# Patient Record
Sex: FEMALE | Race: WHITE
[De-identification: names, ages, dates, MRNs, and addresses within clinical notes are randomized per-mention and may not be internally consistent; named-entity substitution may affect disease eponyms.]

---

## 2018-05-02 ENCOUNTER — HOSPITAL ENCOUNTER (OUTPATIENT)
Dept: HOSPITAL 75 - RAD | Age: 53
End: 2018-05-02
Attending: FAMILY MEDICINE
Payer: COMMERCIAL

## 2018-05-02 DIAGNOSIS — E04.9: ICD-10-CM

## 2018-05-02 DIAGNOSIS — Z12.31: Primary | ICD-10-CM

## 2018-05-02 PROCEDURE — 76536 US EXAM OF HEAD AND NECK: CPT

## 2018-05-02 PROCEDURE — 77067 SCR MAMMO BI INCL CAD: CPT

## 2018-05-02 NOTE — DIAGNOSTIC IMAGING REPORT
INDICATION: Clinical findings of goiter.



TECHNIQUE: Grayscale sonographic images of the thyroid gland.



CORRELATION STUDY: None.



FINDINGS:

RIGHT LOBE: 4.8 x 1.3 x 1.2 cm.  

Within the superior medial aspect a very small slightly

hypoechoic nodule measures 4 x 3 x 3 mm. Remainder of the right

thyroid gland echotexture is unremarkable.



LEFT LOBE: 4.4 x 1.4 x 1.0 cm.  

There is normal echotexture about the left lobe.



Isthmus appears unremarkable.



IMPRESSION:

Slightly elongated thyroid gland. Very small, nonspecific nodule

about the right lobe.



(Normal gland size:  4-5 x 2 x 2 cm)



Dictated by: 



  Dictated on workstation # [id number]

## 2018-05-14 NOTE — DIAGNOSTIC IMAGING REPORT
Indication: Routine screening.



Comparison is made with prior mammogram from 03/11/2015 and

08/17/2010.



2-D and 3-D bilateral mammography was performed with CAD. Implant

protocol was utilized.



Scattered fibroglandular densities are identified bilaterally.

The patient has bilateral breast implants. Implant contours

appear to be smooth. No mass or malignant appearing

microcalcifications are seen.  The axillae are unremarkable.



Impression: BI-RADS category 2



No mammographic features suspicious for malignancy are

identified.



ACR BI-RADS Category 2: Benign findings.

Result letter will be mailed to the patient.

Note: At least 10% of breast cancer is not imaged by mammography.



Dictated by: 



  Dictated on workstation # [id number]

## 2018-09-12 ENCOUNTER — HOSPITAL ENCOUNTER (OUTPATIENT)
Dept: HOSPITAL 75 - CARD | Age: 53
End: 2018-09-12
Attending: INTERNAL MEDICINE
Payer: COMMERCIAL

## 2018-09-12 VITALS — SYSTOLIC BLOOD PRESSURE: 126 MMHG | DIASTOLIC BLOOD PRESSURE: 82 MMHG

## 2018-09-12 VITALS — WEIGHT: 137 LBS | BODY MASS INDEX: 20.76 KG/M2 | HEIGHT: 68 IN

## 2018-09-12 VITALS — SYSTOLIC BLOOD PRESSURE: 151 MMHG | DIASTOLIC BLOOD PRESSURE: 81 MMHG

## 2018-09-12 VITALS — DIASTOLIC BLOOD PRESSURE: 80 MMHG | SYSTOLIC BLOOD PRESSURE: 149 MMHG

## 2018-09-12 DIAGNOSIS — R00.2: Primary | ICD-10-CM

## 2018-09-12 DIAGNOSIS — E07.9: ICD-10-CM

## 2018-09-12 DIAGNOSIS — G43.909: ICD-10-CM

## 2018-09-12 DIAGNOSIS — R06.02: ICD-10-CM

## 2018-09-12 PROCEDURE — 93017 CV STRESS TEST TRACING ONLY: CPT

## 2018-09-12 PROCEDURE — 78452 HT MUSCLE IMAGE SPECT MULT: CPT

## 2018-09-12 NOTE — STRESS TEST
DATE OF SERVICE:  09/12/2018



EXERCISE MYOVIEW STRESS TEST REPORT



Baseline heart rate is 60.  Baseline blood pressure 142/76.  Baseline EKG is

sinus rhythm with no ischemic changes.



In summary, the patient was injected with 10.24 mCi of technetium-99 Myoview and

the resting images were obtained.  Then, the patient started exercising with

baseline heart rate, blood pressure and EKG mentioned above.  She was able to

exercise for a total of 11 minutes on standard Candido protocol.  With peak

exercise level, EKG was showing 1 mm upsloping ST depression in II, III, aVF, V4

and V5.  Blood pressure was 170/80.  She was injected with 31.8 mCi of

technetium-99 Myoview with peak stress level.  During recovery, heart rate and

blood pressure returned to baseline.  EKG returned to baseline.



The resting and stressed images were reviewed and compared in the short axis,

horizontal long axis, and vertical long axis views.  Review of the images showed

breast attenuation affecting the quality of the images.  There is questionable

reversible ischemia involving the mid to apical anterior wall and anterolateral

wall.  SSS is 7, SDS 7, TID value 1.08.  On the gated images, the left ventricle

appeared to be normal size with normal contractility.  Calculated ejection

fraction 60%.



CONCLUSION:

1.  Excellent exercise tolerance, a total of 11 minutes on standard Candido

protocol, total of 12.1 METS achieving 84% of maximum expected heart rate.

2.  Appropriate heart rate and blood pressure response to exercise returned to

baseline during recovery.

3.  Nondiagnostic EKG changes with exercise returned to baseline during

recovery.

4.  Breast attenuation with mild reversible ischemia involving the whole

anterior wall and anterolateral wall.

5.  Normal left ventricular size with normal contractility.  Calculated ejection

fraction 60%.





Job ID: [id number]

DocumentID: [id number]

Dictated Date:  09/12/2018 16:40:20

Transcription Date: 09/12/2018 20:04:44

Dictated By: JOHN JACKSON MD

## 2018-09-20 ENCOUNTER — HOSPITAL ENCOUNTER (OUTPATIENT)
Dept: HOSPITAL 75 - CARD | Age: 53
End: 2018-09-20
Attending: INTERNAL MEDICINE
Payer: COMMERCIAL

## 2018-09-20 DIAGNOSIS — G43.909: ICD-10-CM

## 2018-09-20 DIAGNOSIS — R00.2: Primary | ICD-10-CM

## 2018-09-20 DIAGNOSIS — E07.9: ICD-10-CM

## 2018-09-20 DIAGNOSIS — R06.02: ICD-10-CM

## 2018-09-20 PROCEDURE — 93306 TTE W/DOPPLER COMPLETE: CPT

## 2018-09-26 ENCOUNTER — HOSPITAL ENCOUNTER (OUTPATIENT)
Dept: HOSPITAL 75 - CATH | Age: 53
Discharge: HOME | End: 2018-09-26
Attending: INTERNAL MEDICINE
Payer: COMMERCIAL

## 2018-09-26 VITALS — SYSTOLIC BLOOD PRESSURE: 130 MMHG | DIASTOLIC BLOOD PRESSURE: 81 MMHG

## 2018-09-26 VITALS — HEIGHT: 68 IN | BODY MASS INDEX: 20.61 KG/M2 | WEIGHT: 136 LBS

## 2018-09-26 VITALS — SYSTOLIC BLOOD PRESSURE: 125 MMHG | DIASTOLIC BLOOD PRESSURE: 82 MMHG

## 2018-09-26 VITALS — SYSTOLIC BLOOD PRESSURE: 129 MMHG | DIASTOLIC BLOOD PRESSURE: 85 MMHG

## 2018-09-26 VITALS — DIASTOLIC BLOOD PRESSURE: 79 MMHG | SYSTOLIC BLOOD PRESSURE: 126 MMHG

## 2018-09-26 VITALS — DIASTOLIC BLOOD PRESSURE: 80 MMHG | SYSTOLIC BLOOD PRESSURE: 139 MMHG

## 2018-09-26 VITALS — SYSTOLIC BLOOD PRESSURE: 130 MMHG | DIASTOLIC BLOOD PRESSURE: 84 MMHG

## 2018-09-26 VITALS — SYSTOLIC BLOOD PRESSURE: 138 MMHG | DIASTOLIC BLOOD PRESSURE: 87 MMHG

## 2018-09-26 VITALS — SYSTOLIC BLOOD PRESSURE: 126 MMHG | DIASTOLIC BLOOD PRESSURE: 77 MMHG

## 2018-09-26 VITALS — DIASTOLIC BLOOD PRESSURE: 73 MMHG | SYSTOLIC BLOOD PRESSURE: 135 MMHG

## 2018-09-26 VITALS — SYSTOLIC BLOOD PRESSURE: 130 MMHG | DIASTOLIC BLOOD PRESSURE: 70 MMHG

## 2018-09-26 VITALS — SYSTOLIC BLOOD PRESSURE: 128 MMHG | DIASTOLIC BLOOD PRESSURE: 82 MMHG

## 2018-09-26 VITALS — DIASTOLIC BLOOD PRESSURE: 83 MMHG | SYSTOLIC BLOOD PRESSURE: 142 MMHG

## 2018-09-26 DIAGNOSIS — R07.9: Primary | ICD-10-CM

## 2018-09-26 DIAGNOSIS — I10: ICD-10-CM

## 2018-09-26 DIAGNOSIS — R06.02: ICD-10-CM

## 2018-09-26 DIAGNOSIS — Z79.899: ICD-10-CM

## 2018-09-26 DIAGNOSIS — R53.83: ICD-10-CM

## 2018-09-26 DIAGNOSIS — Z82.49: ICD-10-CM

## 2018-09-26 DIAGNOSIS — E78.5: ICD-10-CM

## 2018-09-26 DIAGNOSIS — I25.10: ICD-10-CM

## 2018-09-26 LAB
ALBUMIN SERPL-MCNC: 4.2 GM/DL (ref 3.2–4.5)
ALP SERPL-CCNC: 57 U/L (ref 40–136)
ALT SERPL-CCNC: 19 U/L (ref 0–55)
APTT BLD: 29 SEC (ref 24–35)
BILIRUB SERPL-MCNC: 0.7 MG/DL (ref 0.1–1)
BUN/CREAT SERPL: 17
CALCIUM SERPL-MCNC: 9.2 MG/DL (ref 8.5–10.1)
CHLORIDE SERPL-SCNC: 106 MMOL/L (ref 98–107)
CHOLEST SERPL-MCNC: 161 MG/DL (ref ?–200)
CO2 SERPL-SCNC: 28 MMOL/L (ref 21–32)
CREAT SERPL-MCNC: 0.78 MG/DL (ref 0.6–1.3)
ERYTHROCYTE [DISTWIDTH] IN BLOOD BY AUTOMATED COUNT: 13.6 % (ref 10–14.5)
GFR SERPLBLD BASED ON 1.73 SQ M-ARVRAT: > 60 ML/MIN
GLUCOSE SERPL-MCNC: 87 MG/DL (ref 70–105)
HCT VFR BLD CALC: 37 % (ref 35–52)
HDLC SERPL-MCNC: 80 MG/DL (ref 40–60)
HGB BLD-MCNC: 12.4 G/DL (ref 11.5–16)
INR PPP: 1 (ref 0.8–1.4)
MCH RBC QN AUTO: 30 PG (ref 25–34)
MCHC RBC AUTO-ENTMCNC: 33 G/DL (ref 32–36)
MCV RBC AUTO: 90 FL (ref 80–99)
PLATELET # BLD: 191 10^3/UL (ref 130–400)
PMV BLD AUTO: 10.3 FL (ref 7.4–10.4)
POTASSIUM SERPL-SCNC: 3.9 MMOL/L (ref 3.6–5)
PROT SERPL-MCNC: 6.5 GM/DL (ref 6.4–8.2)
PROTHROMBIN TIME: 13 SEC (ref 12.2–14.7)
RBC # BLD AUTO: 4.13 10^6/UL (ref 4.35–5.85)
SODIUM SERPL-SCNC: 141 MMOL/L (ref 135–145)
TRIGL SERPL-MCNC: 59 MG/DL (ref ?–150)
VLDLC SERPL CALC-MCNC: 12 MG/DL (ref 5–40)
WBC # BLD AUTO: 4.9 10^3/UL (ref 4.3–11)

## 2018-09-26 PROCEDURE — 80053 COMPREHEN METABOLIC PANEL: CPT

## 2018-09-26 PROCEDURE — 80061 LIPID PANEL: CPT

## 2018-09-26 PROCEDURE — 87081 CULTURE SCREEN ONLY: CPT

## 2018-09-26 PROCEDURE — 85027 COMPLETE CBC AUTOMATED: CPT

## 2018-09-26 PROCEDURE — 85610 PROTHROMBIN TIME: CPT

## 2018-09-26 PROCEDURE — 93458 L HRT ARTERY/VENTRICLE ANGIO: CPT

## 2018-09-26 PROCEDURE — 85730 THROMBOPLASTIN TIME PARTIAL: CPT

## 2018-09-26 PROCEDURE — 71045 X-RAY EXAM CHEST 1 VIEW: CPT

## 2018-09-26 PROCEDURE — 36415 COLL VENOUS BLD VENIPUNCTURE: CPT

## 2018-09-26 NOTE — XMS REPORT
[facility]

 Created on: 2018



Sumi Katina

External Reference #: [id number]

: 1965

Sex: Female



Demographics







 Address  [address] [place]  [address]

 

 Preferred Language  Unknown

 

 Marital Status  Unknown

 

 Cheondoism Affiliation  Unknown

 

 Race  Unknown

 

 Ethnic Group  Unknown





Author







 Author  KALYN  SUKI

 

 Organization  [facility]

 

 Address  [address] [place]  [address]



 

 Phone  [phone]







Care Team Providers







 Care Team Member Name  Role  Phone

 

 SUKI MCCAIN  Unavailable  [phone]







PROBLEMS







 Type  Condition  ICD9-CM Code  KRM06-SS Code  Onset Dates  Condition Status  
SNOMED Code

 

 Problem  Encounter for medical examination to establish care     Z00.00     
Active  739919958

 

 Problem  Acquired hypothyroidism     E03.9     Active  869786781

 

 Problem  Essential hypertension     I10     Active  25618137

 

 Problem  Tachycardia     R00.0     Active  7011544

 

 Problem  Herpes     B00.9     Active  88216035

 

 Problem  Hypercholesterolemia     E78.00     Active  33850257

 

 Problem  Menopausal and female climacteric states     N95.1     Active  
980063834

 

 Problem  Body image disturbance     F45.22     Active  75002939

 

 Problem  Seasonal allergic rhinitis, unspecified allergic rhinitis trigger     
J30.2     Active  257787970







ALLERGIES

No Information



ENCOUNTERS







 Encounter  Location  Date  Diagnosis

 

 [place] [address] [address]
1308     

 

 [place] [address] [address]    Menopausal and female climacteric states N95.1

 

 [facility]  [address] [address]    Essential hypertension I10 ; Tachycardia R00.0 ; 
Postmenopausal Z78.0 ; Menopausal and female climacteric states N95.1 ; 
Acquired hypothyroidism E03.9 ; Body image disturbance F45.22 ; Herpes B00.9 
and Bronchitis J40

 

 [facility]  [address] [address] [id number] [place] [address]  17 Oct, 2017  Acquired hypothyroidism E03.9

 

 [place] [address] [address]
2018  13 Oct, 2017  Essential hypertension I10 ; Acquired hypothyroidism E03.9 
and Hypercholesterolemia E78.00

 

 [facility]  [id number] [place] [address]-
[address]  01 Oct, 2017   

 

 [facility]  [id number] [place] [address]-
1460  06 Sep, 2017  Essential hypertension I10 ; Hypercholesterolemia E78.00 
and Acquired hypothyroidism E03.9

 

 [facility]  [address] [address]-
[address]  26 May, 2017   

 

 [facility]  [id number] [place] [address]-
6578  19 May, 2017   

 

 [facility]  [id number] [place] [address]-
8396    Essential hypertension I10 ; Acquired hypothyroidism E03.9 
; Menopausal and female climacteric states N95.1 ; Hypercholesterolemia E78.00 
and Seasonal allergic rhinitis, unspecified allergic rhinitis trigger J30.2

 

 [facility]  [id number] [place] [address]-
360[address]     

 

 [facility]  [address] [address]-
[phone]  10 Apr, 2017  Bronchitis J40 and Cough R05

 

 [facility]  [id number] [place] [phone]    Seasonal allergic rhinitis, unspecified allergic rhinitis 
trigger J30.2

 

 [facility]  [id number] [place] [address]-
6[address]     

 

 [facility]  [id number] [place] [address]-
5261  31 Oct, 2016   

 

 [facility]  [id number] [place] [address]-
1780  25 Oct, 2016  Essential hypertension I10 ; Acquired hypothyroidism E03.9 
and Encounter for medical examination to establish care Z00.00

 

 [facility]  [address] 
[id number]  24 Oct, 2016  Pharyngitis, unspecified etiology J02.9

 

 [facility]  [id number], [place] [address]-
3780  11 Oct, 2016   

 

 [facility]  [id number] [address]  26 Sep, 2016  Essential hypertension I10 ; Menopausal and female 
climacteric states N95.1 ; Acquired hypothyroidism E03.9 and Encounter for 
medical examination to establish care Z00.00

 

 [facility]  [id number], [place] [address]-
[address]  23 Sep, 2016   

 

 [facility]  [id number] [place] [address]-
8239  21 Sep, 2016   

 

 [facility]  [id number] [place] [phone]-
7445  09 Sep, 2016   

 

 [facility] [place]  [id number] 
[id number]  30 Aug, 2016  Sore throat J02.9

 

 [facility]  [address] [place] [id number] [address]  10 Mar, 2016  Sore throat J02.9 and Acute streptococcal pharyngitis J02.0

 

 [facility]  [id number], [place] [address]-
7[address]  21 Sep, 2015  Folliculitis 704.8







IMMUNIZATIONS

No Known Immunizations



SOCIAL HISTORY

Never Assessed



REASON FOR VISIT

refill



PLAN OF CARE





VITAL SIGNS





MEDICATIONS







 Medication  Instructions  Dosage  Frequency  Start Date  End Date  Duration  
Status

 

 Atorvastatin Calcium 10 mg     TAKE ONE TABLET BY MOUTH ONCE DAILY           
14  Active







RESULTS

No Results



PROCEDURES

No Known procedures



INSTRUCTIONS





MEDICATIONS ADMINISTERED

No Known Medications



MEDICAL (GENERAL) HISTORY







 Type  Description  Date

 

 Medical History  Hypothyroidism   

 

 Medical History  Menopausal   

 

 Medical History  Hypertension   

 

 Surgical History  partial hysterectomy  

 

 Surgical History  tonsillectomy and adenoidectomy

## 2018-09-26 NOTE — CARDIAC CATH REPORT
Cardiac Cath Report


Physician (s)/Assistant (s)


Physician


OJHN JACKSON MD





Pre-Procedure Diagnosis


Pre-Procedure Diagnosis:  Coronary artery disease





Post-Procedure Note


Procedure Start Date:  Sep 26, 2018


Name of Procedure:  


Left heart catheterization


Findings/Procedure Note


PROCEDURE NOTE:


After explaining the procedure to the patient, all pros and cons were explained

, all questions were answered.  The patient signed the consent and then she  

was placed on the cardiac catheterization laboratory. Groin was prepped SL 

fashion local anesthesia was used. Sheath placed in the right radial artery.  

Tiger catheter was used to evaluate the coronaries, left ventricular pressure 

was measured no left ventricular gram was done


At the end of the procedure the sheath was removed. 





FINDINGS:





Hemodynamics 


/25 elevated end-diastolic pressure


Aorta 123/78 mean of 96





ANATOMY:


Left Main is free of obstructive disease


Left Anterior Descending has mild disease nonobstructive disease


Left Circumflex has mild disease nonobstructive disease


Right Coronory Artery is free of obstructive disease





CONCLUSION:


1.  Mild coronary artery disease, no sig obstructive disease


2.  Elevated left ventricular end diastolic pressure





DISCUSSION AND RECOMMENDATION:


Continue with medical therapy, no intervention is needed


Anesthesia Type:  Conscious Sedation


Estimated blood loss (mL):  10 ml


Contrast Amount:  21 ml


Total Radiation Dose:  48 mGy





Post-Procedure Diagnosis


Post-operative diagnosis:  


Chest pain nonspecific etiology


Coronary artery disease


Hypertension


Hyperlipidemia











JOHN JACKSON MD Sep 26, 2018 09:17

## 2018-09-26 NOTE — XMS REPORT
[facility]

 Created on: 2017



Katina Jonas

External Reference #: [id number]

: 1965

Sex: Female



Demographics







 Address  [address] [place]  [address]

 

 Preferred Language  Unknown

 

 Marital Status  Unknown

 

 Episcopal Affiliation  Unknown

 

 Race  Unknown

 

 Ethnic Group  Unknown





Author







 Author  TERESSA CASPER

 

 [facility]

 

 Address  [address] [place]  [address]



 

 Phone  [phone]







Care Team Providers







 Care Team Member Name  Role  Phone

 

 TRANGDEETERESSA  Unavailable  [phone]







PROBLEMS







 Type  Condition  ICD9-CM Code  MFM56-IH Code  Onset Dates  Condition Status  
SNOMED Code

 

 Assessment  Sore throat     J02.9  30 Aug, 2016  Active  [id number]







ALLERGIES







 Substance  Reaction  Event Type  Date  Status

 

 Sulfamethoxazole-Trimethoprim  Unknown  Drug Allergy  30 Aug, 2016  Active







SOCIAL HISTORY

No smoking Hx information available



PLAN OF CARE





VITAL SIGNS







 Height  68 in  2016

 

 Weight  150 lbs  2016

 

 Heart Rate  76 bpm  2016

 

 Respiratory Rate  18   2016

 

 BMI  22.80 kg/m2  2016

 

 Blood pressure systolic  120 mmHg  2016

 

 Blood pressure diastolic  68 mmHg  2016







MEDICATIONS







 Medication  Instructions  Dosage  Frequency  Start Date  End Date  Duration  
Status

 

 Estrace 0.1 MG/GM                    Active

 

 Toprol XL 25 MG  Orally Once a day  1 tablet  24h           Active

 

 Levothyroxine Sodium 25 MCG  Orally Once a day  1 tablet  24h           Active

 

 Valacyclovir HCl 500 MG  Orally Once a day  1 tablet  24h           Active

 

 Divigel 1 MG/GM  Transdermal Once a day  1 application to affected area  24h  
         Active







RESULTS







 Name  Result  Date  Reference Range

 

 STREP A (IN HOUSE)         

 

 STREP A  negative      

 

 Control  +      

 

 Lot #  [id number]      

 

 Exp date  2016      







PROCEDURES







 Procedure  Date Ordered  Related Diagnosis  Body Site

 

 STREP A ASSAY W/OPTIC  Aug 30, 2016      

 

 Office Visit, Est Pt., Level 3  Aug 30, 2016      







IMMUNIZATIONS

No Known Immunizations

## 2018-09-26 NOTE — XMS REPORT
[facility]

 Created on: 2017



Katina Jonas

External Reference #: [id number]

: 1965

Sex: Female



Demographics







 Address  [address] [place]  [address]

 

 Preferred Language  Unknown

 

 Marital Status  Unknown

 

 Moravian Affiliation  Unknown

 

 Race  Unknown

 

 Ethnic Group  Unknown





Author







 Author  SALLY BERG

 

 [facility]

 

 Address  [address]  [place]  [address]



 

 Phone  [phone]







Care Team Providers







 Care Team Member Name  Role  Phone

 

 SALLY BERG  Unavailable  [phone]







PROBLEMS







 Type  Condition  ICD9-CM Code  WGR99-MJ Code  Onset Dates  Condition Status  
SNOMED Code

 

 Problem  Seasonal allergic rhinitis, unspecified allergic rhinitis trigger     
J30.2     Active  857623547

 

 Problem  Hypercholesterolemia     E78.00     Active  93212961

 

 Problem  Acquired hypothyroidism     E03.9     Active  512800623

 

 Problem  Essential hypertension     I10     Active  73821981

 

 Problem  Encounter for medical examination to establish care     Z00.00     
Active  073298981

 

 Problem  Menopausal and female climacteric states     N95.1     Active  
979586128







ALLERGIES

Unknown Allergies



SOCIAL HISTORY

No smoking Hx information available



PLAN OF CARE





VITAL SIGNS





MEDICATIONS

Unknown Medications



RESULTS

No Results



PROCEDURES

No Known procedures



IMMUNIZATIONS

No Known Immunizations

## 2018-09-26 NOTE — XMS REPORT
[facility]

 Created on: 2017



John Jonasa

External Reference #: [id number]

: 1965

Sex: Female



Demographics







 Address  [address] [place]  [address]

 

 Preferred Language  Unknown

 

 Marital Status  Unknown

 

 Religion Affiliation  Unknown

 

 Race  Unknown

 

 Ethnic Group  Unknown





Author







 Author  SALLY BERG

 

 [facility]

 

 Address  [address]  [place]  [address]



 

 Phone  [phone]







Care Team Providers







 Care Team Member Name  Role  Phone

 

 SALLY BERG  Unavailable  [phone]







PROBLEMS







 Type  Condition  ICD9-CM Code  BLG35-CS Code  Onset Dates  Condition Status  
SNOMED Code

 

 Problem  Seasonal allergic rhinitis, unspecified allergic rhinitis trigger     
J30.2     Active  277299734

 

 Problem  Hypercholesterolemia     E78.00     Active  97577805

 

 Problem  Acquired hypothyroidism     E03.9     Active  558800367

 

 Problem  Essential hypertension     I10     Active  83598570

 

 Problem  Encounter for medical examination to establish care     Z00.00     
Active  279368541

 

 Problem  Menopausal and female climacteric states     N95.1     Active  
179038512







ALLERGIES

No Information



SOCIAL HISTORY

Never Assessed



PLAN OF CARE





VITAL SIGNS





MEDICATIONS







 Medication  Instructions  Dosage  Frequency  Start Date  End Date  Duration  
Status

 

 Valacyclovir HCl 500 MG  Orally Once a day  1 tablet  24h           Active







RESULTS

No Results



PROCEDURES

No Known procedures



IMMUNIZATIONS

No Known Immunizations



MEDICAL (GENERAL) HISTORY







 Type  Description  Date

 

 Medical History  Hypothyroidism   

 

 Medical History  Menopausal   

 

 Medical History  Hypertension   

 

 Surgical History  partial hysterectomy  

 

 Surgical History  tonsillectomy and adenoidectomy

## 2018-09-26 NOTE — XMS REPORT
[facility]

 Created on: 2017



Sumi Katina

External Reference #: [id number]

: 1965

Sex: Female



Demographics







 Address  [address]  [address]

 

 Preferred Language  Unknown

 

 Marital Status  Unknown

 

 Yazidism Affiliation  Unknown

 

 Race  Unknown

 

 Ethnic Group  Unknown





Author







 Author  SALLY BERG

 

 Organization  [facility]

 

 Address  [address]  [place]  [address]



 

 Phone  [phone]







Care Team Providers







 Care Team Member Name  Role  Phone

 

 SALLY BERG  Unavailable  [phone]







PROBLEMS







 Type  Condition  ICD9-CM Code  HXL27-AL Code  Onset Dates  Condition Status  
SNOMED Code

 

 Problem  Encounter for medical examination to establish care     Z00.00     
Active  345529089

 

 Problem  Acquired hypothyroidism     E03.9     Active  763024472

 

 Problem  Essential hypertension     I10     Active  52102862

 

 Problem  Tachycardia     R00.0     Active  7970646

 

 Problem  Herpes     B00.9     Active  97616273

 

 Problem  Hypercholesterolemia     E78.00     Active  76346680

 

 Problem  Menopausal and female climacteric states     N95.1     Active  
126490404

 

 Problem  Body image disturbance     F45.22     Active  06635207

 

 Problem  Seasonal allergic rhinitis, unspecified allergic rhinitis trigger     
J30.2     Active  098157418







ALLERGIES

No Information



SOCIAL HISTORY

Never Assessed



PLAN OF CARE





VITAL SIGNS





MEDICATIONS







 Medication  Instructions  Dosage  Frequency  Start Date  End Date  Duration  
Status

 

 Valacyclovir HCl 500 MG  Orally Once a day  1 tablet  24h        30 days  
Active







RESULTS

No Results



PROCEDURES

No Known procedures



IMMUNIZATIONS

No Known Immunizations



MEDICAL (GENERAL) HISTORY







 Type  Description  Date

 

 Medical History  Hypothyroidism   

 

 Medical History  Menopausal   

 

 Medical History  Hypertension   

 

 Surgical History  partial hysterectomy  

 

 Surgical History  tonsillectomy and adenoidectomy

## 2018-09-26 NOTE — XMS REPORT
[facility]

 Created on: 10/25/2016



ElizabetKatina

External Reference #: [id number]

: 1965

Sex: Female



Demographics







 Address  [address] [place]  [address]

 

 Home Phone  [phone]

 

 Preferred Language  Unknown

 

 Marital Status  Unknown

 

 Taoist Affiliation  Unknown

 

 Race  White

 

 Ethnic Group  Not  or 





Author







 Author  SALLY BERG

 

 Organization  [employer]

 

 Address  Unknown

 

 Phone  Unavailable







Care Team Providers







 Care Team Member Name  Role  Phone

 

 SALLY BERG    Unavailable



                                                                



Allergies

          No Known Allergies                                                   
                                     



Problems

          





 Problem Type  Condition  Code  Onset Dates  Condition Status

 

 Problem  Menopausal and female climacteric states  N95.1     Active

 

 Problem  Acquired hypothyroidism  E03.9     Active

 

 Problem  Essential hypertension  I10     Active

 

 Assessment  Acquired hypothyroidism  E03.9     Active

 

 Assessment  Encounter for medical examination to establish care  Z00.00     
Active

 

 Problem  Encounter for medical examination to establish care  Z00.00     Active

 

 Assessment  Essential hypertension  I10     Active



                                                                               
                                                                     



Medications

          No Known Medications                                                 
                                       



Procedures

          





 Procedure  Coding System  Code  Date

 

 ASSAY OF FREE THYROXINE  CPT-4  10845  Oct 25, 2016

 

 COMPLETE CBC W/AUTO DIFF WBC  CPT-4  97154  Oct 25, 2016

 

 ASSAY THYROID STIM HORMONE  CPT-4  07042  Oct 25, 2016

 

 COMPREHEN METABOLIC PANEL  CPT-4  98265  Oct 25, 2016

 

 LIPID PANEL  CPT-4  79912  Oct 25, 2016

 

 VENIPUNCT, ROUTINE*  CPT-4  76299  Oct 25, 2016



                                                                               
                                       



Results

          





 Name  Result  Date  Reference Range  Unit  Abnormality Flag

 

 ROUTINE VENIPUNCTURE               



                                                                    



Summary Purpose

          [employer] Submission

## 2018-09-26 NOTE — XMS REPORT
[facility]

 Created on: 2018



SumiKatina small

External Reference #: [id number]

: 1965

Sex: Female



Demographics







 Address  [address] [place]  [address]

 

 Preferred Language  Unknown

 

 Marital Status  Unknown

 

 Christianity Affiliation  Unknown

 

 Race  Unknown

 

 Ethnic Group  Unknown





Author







 Author  Carley  SALLY

 

 Organization  [facility]

 

 Address  [address]  [place]  [address]



 

 Phone  [phone]







Care Team Providers







 Care Team Member Name  Role  Phone

 

 SALLY Howe  Unavailable  [phone]







PROBLEMS







 Type  Condition  ICD9-CM Code  ENA65-FC Code  Onset Dates  Condition Status  
SNOMED Code

 

 Problem  Encounter for medical examination to establish care     Z00.00     
Active  996537126

 

 Problem  Acquired hypothyroidism     E03.9     Active  833144738

 

 Problem  Essential hypertension     I10     Active  40858083

 

 Problem  Tachycardia     R00.0     Active  3082806

 

 Problem  Herpes     B00.9     Active  61702398

 

 Problem  Hypercholesterolemia     E78.00     Active  50261692

 

 Problem  Menopausal and female climacteric states     N95.1     Active  
721952937

 

 Problem  Body image disturbance     F45.22     Active  56030418

 

 Problem  Seasonal allergic rhinitis, unspecified allergic rhinitis trigger     
J30.2     Active  778400672







ALLERGIES

No Information



ENCOUNTERS







 Encounter  Location  Date  Diagnosis

 

 [place] [address] [address]
20[address]    Menopausal and female climacteric states N95.1

 

 [place] [address] [address]
9226    Essential hypertension I10 ; Tachycardia R00.0 ; 
Postmenopausal Z78.0 ; Menopausal and female climacteric states N95.1 ; 
Acquired hypothyroidism E03.9 ; Body image disturbance F45.22 ; Herpes B00.9 
and Bronchitis J40

 

 [facility]  [address] [address]-
[address]  17 Oct, 2017  Acquired hypothyroidism E03.9

 

 [facility]  [address] [address]-
2081  13 Oct, 2017  Essential hypertension I10 ; Acquired hypothyroidism E03.9 
and Hypercholesterolemia E78.00

 

 [place] [address] [place] [id number] [place] [address]-
9929  01 Oct, 2017   

 

 [facility]  [address] [place] [id number] [place] [address]-
2375  06 Sep, 2017  Essential hypertension I10 ; Hypercholesterolemia E78.00 
and Acquired hypothyroidism E03.9

 

 [facility]  [address] [place] [id number] [place] [address]-
1272  26 May, 2017   

 

 [facility]  [address] [address]-
4740  19 May, 2017   

 

 [facility]  [address] [place] [id number] [place] [address]-
7994    Essential hypertension I10 ; Acquired hypothyroidism E03.9 
; Menopausal and female climacteric states N95.1 ; Hypercholesterolemia E78.00 
and Seasonal allergic rhinitis, unspecified allergic rhinitis trigger J30.2

 

 [facility]  [address] [address]-
[address]     

 

 [facility]  [address] [place] [id number] [place] [address]-
1724  10 Apr, 2017  Bronchitis J40 and Cough R05

 

 [facility]  [address] [place] [id number] [place] [address]
[phone]    Seasonal allergic rhinitis, unspecified allergic rhinitis 
trigger J30.2

 

 [facility]  [address] [place] [id number] [place] [address]-
8340     

 

 [facility]  [address] [place] [id number] [place] [address]-
9801  31 Oct, 2016   

 

 [facility]  [address] [place] [id number] [place] [address]-
[phone]  25 Oct, 2016  Essential hypertension I10 ; Acquired hypothyroidism E03.9 
and Encounter for medical examination to establish care Z00.00

 

 [facility]  [address] [place] [id number] [place] [address] 
[phone]  24 Oct, 2016  Pharyngitis, unspecified etiology J02.9

 

 [facility]  [address] [address]-
3039  11 Oct, 2016   

 

 [facility]  [address] [place] [address]-
3908  26 Sep, 2016  Essential hypertension I10 ; Menopausal and female 
climacteric states N95.1 ; Acquired hypothyroidism E03.9 and Encounter for 
medical examination to establish care Z00.00

 

 [facility]  [address] [place] [id number] [place] [address]-
5674  23 Sep, 2016   

 

 [facility]  [address] [place] [id number] [place] [address]-
[phone]  21 Sep, 2016   

 

 [facility]  [address] [place] [id number] [place] [address]-
[phone]  09 Sep, 2016   

 

 [facility] [place]  [address] [place] [id number] [place] [address] 
[phone]  30 Aug, 2016  Sore throat J02.9

 

 [facility] WALK IN [facility]  [address], [place] [address]
-[phone]  10 Mar, 2016  Sore throat J02.9 and Acute streptococcal pharyngitis J02.0

 

 [facility]  [address] [place] [id number] [place] [address]-
2015  21 Sep, 2015  Folliculitis 704.8







IMMUNIZATIONS

No Known Immunizations



SOCIAL HISTORY

Never Assessed



REASON FOR VISIT





PLAN OF CARE





VITAL SIGNS





MEDICATIONS







 Medication  Instructions  Dosage  Frequency  Start Date  End Date  Duration  
Status

 

 Valacyclovir HCl 500 mg  Orally Once a day  1 tablet  24h           Active

 

 Toprol XL 25 MG  Orally Once a day  1 tablet  24h           Active

 

 Levothyroxine Sodium 25 MCG  Orally Once a day  1 tablet  24h           Active

 

 Atorvastatin Calcium 10 mg  Orally Once a day  1 tablet  24h      
    Active







RESULTS

No Results



PROCEDURES

No Known procedures



INSTRUCTIONS





MEDICATIONS ADMINISTERED

No Known Medications



MEDICAL (GENERAL) HISTORY







 Type  Description  Date

 

 Medical History  Hypothyroidism   

 

 Medical History  Menopausal   

 

 Medical History  Hypertension   

 

 Surgical History  partial hysterectomy  2005

 

 Surgical History  tonsillectomy and adenoidectomy

## 2018-09-26 NOTE — XMS REPORT
[facility]

 Created on: 10/13/2016



ElizabetKatina

External Reference #: [id number]

: 1965

Sex: Female



Demographics







 Address  [address] [place]  [address]

 

 Home Phone  [phone]

 

 Preferred Language  Unknown

 

 Marital Status  Unknown

 

 Moravian Affiliation  Unknown

 

 Race  White

 

 Ethnic Group  Not  or 





Author







 Author  SALLY BERG

 

 Organization  [employer]

 

 Address  Unknown

 

 Phone  Unavailable







Care Team Providers







 Care Team Member Name  Role  Phone

 

 SALLY BERG    Unavailable



                                                                



Allergies

          No Known Allergies                                                   
                                     



Problems

          





 Problem Type  Condition  Code  Onset Dates  Condition Status

 

 Problem  Menopausal and female climacteric states  N95.1     Active

 

 Problem  Acquired hypothyroidism  E03.9     Active

 

 Problem  Essential hypertension  I10     Active

 

 Problem  Encounter for medical examination to establish care  Z00.00     Active



                                                                               
                                       



Medications

          





 Medication  Code System  Code  Instructions  Start Date  End Date  Status  
Dosage

 

 Estrace  NDC  [address]-59  0.1 MG/GM Vaginal Once a day           1 gram

 

 Premarin  NDC  [address]-50  0.625 MG/GM Vaginal Once a day  Oct 13, 2016    
    as directed

 

 Divigel  NDC  [address]-50  1 MG/GM Transdermal Once a day           1 
application to affected area



                                                                               
                   



Results

          No Known Results                                                     
               



Summary Purpose

          [employer] Submission

## 2018-09-26 NOTE — XMS REPORT
[facility]

 Created on: 2017



Katina Jonas

External Reference #: [id number]

: 1965

Sex: Female



Demographics







 Address  [address] [place] [address]  [address]

 

 Preferred Language  Unknown

 

 Marital Status  Unknown

 

 Restoration Affiliation  Unknown

 

 Race  Unknown

 

 Ethnic Group  Unknown





Author







 Author  SALLY BERG

 

 [facility]

 

 Address  [address]  [place]  [address]



 

 Phone  [phone]







Care Team Providers







 Care Team Member Name  Role  Phone

 

 SALLY BERG  Unavailable  [phone]







PROBLEMS

Unknown Problems



ALLERGIES

Unknown Allergies



SOCIAL HISTORY

No smoking Hx information available



PLAN OF CARE





VITAL SIGNS





MEDICATIONS







 Medication  Instructions  Dosage  Frequency  Start Date  End Date  Duration  
Status

 

 Toprol XL 25 MG  Orally Once a day  1 tablet  24h           Active

 

 Valacyclovir HCl 500 MG  Orally Once a day  1 tablet  24h           Active







RESULTS

No Results



PROCEDURES

No Known procedures



IMMUNIZATIONS

No Known Immunizations

## 2018-09-26 NOTE — XMS REPORT
[facility]

 Created on: 2018



Sumi Katina

External Reference #: [id number]

: 1965

Sex: Female



Demographics







 Address  [address] [place]  [address]

 

 Preferred Language  Unknown

 

 Marital Status  Unknown

 

 Judaism Affiliation  Unknown

 

 Race  Unknown

 

 Ethnic Group  Unknown





Author







 Author  Carley  SALLY

 

 Organization  [facility]

 

 Address  [address]  [place]  [address]



 

 Phone  [phone]







Care Team Providers







 Care Team Member Name  Role  Phone

 

 SALLY Howe  Unavailable  [phone]







PROBLEMS







 Type  Condition  ICD9-CM Code  QWC00-BO Code  Onset Dates  Condition Status  
SNOMED Code

 

 Problem  Encounter for medical examination to establish care     Z00.00     
Active  199273018

 

 Problem  Acquired hypothyroidism     E03.9     Active  246606141

 

 Problem  Essential hypertension     I10     Active  86544284

 

 Problem  Tachycardia     R00.0     Active  2378498

 

 Problem  Herpes     B00.9     Active  41065196

 

 Problem  Hypercholesterolemia     E78.00     Active  98016426

 

 Problem  Menopausal and female climacteric states     N95.1     Active  
704124760

 

 Problem  Body image disturbance     F45.22     Active  10277699

 

 Problem  Seasonal allergic rhinitis, unspecified allergic rhinitis trigger     
J30.2     Active  626699632







ALLERGIES

No Information



ENCOUNTERS







 Encounter  Location  Date  Diagnosis

 

 [place] [address] [address]  20 2018  Menopausal and female climacteric states N95.1

 

 [place] [address] [address]-
4819    Essential hypertension I10 ; Tachycardia R00.0 ; 
Postmenopausal Z78.0 ; Menopausal and female climacteric states N95.1 ; 
Acquired hypothyroidism E03.9 ; Body image disturbance F45.22 ; Herpes B00.9 
and Bronchitis J40

 

 [facility]  [address] [address]-
[address]  17 Oct, 2017  Acquired hypothyroidism E03.9

 

 [facility]  [address] [address]-
1433  13 Oct, 2017  Essential hypertension I10 ; Acquired hypothyroidism E03.9 
and Hypercholesterolemia E78.00

 

 [place] [address] [place] [id number] [place] [address]-
6309  01 Oct, 2017   

 

 [facility]  [address] [place] [id number] [place] [address]-
9539  06 Sep, 2017  Essential hypertension I10 ; Hypercholesterolemia E78.00 
and Acquired hypothyroidism E03.9

 

 [facility]  [address] [place] [id number] [place] [address]-
1587  26 May, 2017   

 

 [facility]  [address] [address]-
0156  19 May, 2017   

 

 [facility]  [address] [place] [id number] [place] [address]-
9121    Essential hypertension I10 ; Acquired hypothyroidism E03.9 
; Menopausal and female climacteric states N95.1 ; Hypercholesterolemia E78.00 
and Seasonal allergic rhinitis, unspecified allergic rhinitis trigger J30.2

 

 [facility]  [address] [address]-
[address]     

 

 [facility]  [address] [place] [id number] [place] [address]-
0006  10 Apr, 2017  Bronchitis J40 and Cough R05

 

 [facility]  [address] [place] [id number] [place] [address]
[phone]    Seasonal allergic rhinitis, unspecified allergic rhinitis 
trigger J30.2

 

 [facility]  [address] [place] [id number] [place] [address]-
7987     

 

 [facility]  [address] [place] [id number] [place] [address]-
6165  31 Oct, 2016   

 

 [facility]  [address] [place] [id number] [place] [address]-
0182  25 Oct, 2016  Essential hypertension I10 ; Acquired hypothyroidism E03.9 
and Encounter for medical examination to establish care Z00.00

 

 [facility]  [address] [place] [id number] [place] [address] 
[phone]  24 Oct, 2016  Pharyngitis, unspecified etiology J02.9

 

 [facility]  [address], [place] [address]-
5779  11 Oct, 2016   

 

 [facility]  [address] [place] [address]-
0482  26 Sep, 2016  Essential hypertension I10 ; Menopausal and female 
climacteric states N95.1 ; Acquired hypothyroidism E03.9 and Encounter for 
medical examination to establish care Z00.00

 

 [facility]  [address] [place] [id number] [place] [address]-
1481  23 Sep, 2016   

 

 [facility]  [address] [place] [id number] [place] [address]-
2362  21 Sep, 2016   

 

 [facility]  [address] [place] [id number] [place] [address]-
5982  09 Sep, 2016   

 

 [facility] [place]  [address] [place] [id number] [place] [address] 
[id number]  30 Aug, 2016  Sore throat J02.9

 

 [facility] WALK IN [facility]  [address] [place] [address]
[phone]  10 Mar, 2016  Sore throat J02.9 and Acute streptococcal pharyngitis J02.0

 

 [facility]  [address] [place] [id number] [place] [address]-
2704  21 Sep, 2015  Folliculitis 704.8







IMMUNIZATIONS

No Known Immunizations



SOCIAL HISTORY

Never Assessed



REASON FOR VISIT

Requests return call



PLAN OF CARE





VITAL SIGNS





MEDICATIONS

Unknown Medications



RESULTS

No Results



PROCEDURES

No Known procedures



INSTRUCTIONS





MEDICATIONS ADMINISTERED

No Known Medications



MEDICAL (GENERAL) HISTORY







 Type  Description  Date

 

 Medical History  Hypothyroidism   

 

 Medical History  Menopausal   

 

 Medical History  Hypertension   

 

 Surgical History  partial hysterectomy  2005

 

 Surgical History  tonsillectomy and adenoidectomy

## 2018-09-26 NOTE — XMS REPORT
[facility]

 Created on: 2017



Katina Jonas

External Reference #: [id number]

: 1965

Sex: Female



Demographics







 Address  [address]  [address]

 

 Preferred Language  Unknown

 

 Marital Status  Unknown

 

 Hindu Affiliation  Unknown

 

 Race  Unknown

 

 Ethnic Group  Unknown





Author







 Author  SALLY BERG

 

 [facility]

 

 Address  [address]  [place]  [address]



 

 Phone  [phone]







Care Team Providers







 Care Team Member Name  Role  Phone

 

 SALLY BERG  Unavailable  [phone]







PROBLEMS







 Type  Condition  ICD9-CM Code  KXX44-LA Code  Onset Dates  Condition Status  
SNOMED Code

 

 Problem  Essential hypertension     I10     Active  18620850

 

 Problem  Menopausal and female climacteric states     N95.1     Active  
709612950

 

 Problem  Acquired hypothyroidism     E03.9     Active  363857435

 

 Problem  Encounter for medical examination to establish care     Z00.00     
Active  802202315







ALLERGIES

Unknown Allergies



SOCIAL HISTORY

No smoking Hx information available



PLAN OF CARE





VITAL SIGNS





MEDICATIONS







 Medication  Instructions  Dosage  Frequency  Start Date  End Date  Duration  
Status

 

 Levothyroxine Sodium 25 MCG  Orally Once a day  1 tablet  24h           Active

 

 Toprol XL 25 MG  Orally Once a day  1 tablet  24h           Active

 

 Valacyclovir HCl 500 MG  Orally Once a day  1 tablet  24h           Active







RESULTS

No Results



PROCEDURES

No Known procedures



IMMUNIZATIONS

No Known Immunizations

## 2018-09-26 NOTE — DIAGNOSTIC IMAGING REPORT
INDICATION: Coronary artery disease and dyspnea.



Portable upright AP view of the chest is obtained.



COMPARISON: No previous study is available for comparison at this

time.



FINDINGS: Heart size and pulmonary vasculature are within normal

limits, and the lungs are clear, bilaterally.  



IMPRESSION: Unremarkable chest.   



Dictated by: 



  Dictated on workstation # [id number]

## 2018-09-26 NOTE — CARDIAC PROCEDURE NOTE-CS/ASA
Pre-Procedure Note


Pre-Op Procedure Note


H&P Reviewed


The H&P was reviewed, patient examined and no changes noted.


Date H&P Reviewed:  Sep 26, 2018


Time H&P Reviewed:  08:38





Conscious Sedation Pre-Proced


Time


08:38





ASA Score


3


For ASA 3 and 4: Consider anesthesia and medical clearance. Also, for 

patients with a history of failed moderate sedation consider anesthesia.

















Airway 


 


Lungs 


 


Heart 


 


 ASA score


 


 ASA 1: a normal healthy patient


 


 ASA 2:  a patient with a mild systemic disease (mid diabetes, controlled 

hypertension, obesity 


 


x ASA 3:  a patient with a severe systemic disease that limits activity  (angina

, COPD, prior Myocardial infarction)


 


 ASA 4:  a patient with an incapacitating disease that is a constant threat to 

life (CHF, renal failure)


 


 ASA 5:  a moribund patient not expected to survive 24 hrs.  (ruptured aneurysm)


 


 ASA 6:  a declared brain dead patient whose organs are being harvested.


 


 For emergent operations, add the letter E after the classification











Mallampati Classification


Grade 3





Sedation Plan


Analgesia, Amnesia, Plan communicated to team members, Discussed options with 

patient/fam, Discussed risks with patient/fam


The patient is an appropriate candidate to undergo the planned procedure, 

sedation, and anesthesia.





The patient immediately re-assessed prior to indication.











JOHN JACKSON MD Sep 26, 2018 08:38

## 2018-09-26 NOTE — XMS REPORT
[facility]

 Created on: 2016



Katina Mcneal

External Reference #: [id number]

: 1965

Sex: Female



Demographics







 Address  [address] [place]  [address]

 

 Home Phone  [phone]

 

 Preferred Language  Unknown

 

 Marital Status  Unknown

 

 Alevism Affiliation  Unknown

 

 Race  White

 

 Ethnic Group  Not  or 





Author







 Author  SALLY BERG

 

 [facility]  [employer]

 

 Address  Unknown

 

 Phone  Unavailable







Care Team Providers







 Care Team Member Name  Role  Phone

 

 SALLY BERG    Unavailable



                                                                



Allergies

          No Known Allergies                                                   
                                     



Problems

          





 Problem Type  Condition  Code  Onset Dates  Condition Status

 

 Problem  Menopausal and female climacteric states  N95.1     Active

 

 Problem  Acquired hypothyroidism  E03.9     Active

 

 Problem  Essential hypertension  I10     Active

 

 Problem  Encounter for medical examination to establish care  Z00.00     Active



                                                                               
                                       



Medications

          





 Medication  Code System  Code  Instructions  Start Date  End Date  Status  
Dosage

 

 Atorvastatin Calcium  NDC  [address]-65  10 mg Orally Once a day  2016        1 tablet



                                                                              



Results

          No Known Results                                                     
               



Summary Purpose

          [employer] Submission

## 2018-09-26 NOTE — DISCHARGE INST-POST CATH
Discharge Inst-CATH


Post Cardiac Cath D/C Inst


Follow Up/Plan


Appointment with Dr. Sy's office in 2-4 weeks


CARDIAC CATH DISCHARGE INSTRUCTIONS





*Hold Metformin for 48 hours post heart cath.





ACTIVITY





* Go Home directly and rest.


* Limit activity of the leg (or wrist if it was used) for 7 days including 

aerobics, swimming,


   jogging, bicycling, etc.


* Restrict stair-climbing for 7 days if possible, if not, climb up with your non

-cath leg, then


   bring together on the same step.


* Avoid lifting, pushing, pulling or excessive movement of the affected 

extremity for 7 days.


* Customary sexual activity may be resumed after 2 days-use caution not to use 

a position  


   that strains or causes pain to the affected extremity.


* No driving for 24 hours.


* NO SMOKING. 


* Avoid straining for bowel movements for 7 days.


* Gentle walking on level ground is allowed.


* Returning to work will depend on the type of procedure and the results. Your 

doctor will discuss


   this with you.





CALL YOUR DOCTOR FOR ANY OF THE FOLLOWING:





*If bleeding from the puncture site occurs- Apply gentle pressure to site with 

clean cloth and call


   your doctor or EMS.


* If a knot or lump forms under the skin, increases in size, or causes pain.


* If bruising appears to be worsening or moving further down your leg instead 

of disappearing.


* Temperature above 101 F.





CARE OF YOUR GROIN INCISION;





* Bruising or purple discoloration of the skin near the puncture site is common.


* You may shower only, no bathtub bathing for 5 days.  Be careful to avoid 

slipping as your


   leg may feel stiff.


* If a closure device was used on your femoral artery, please see the attached 

guide regarding


   care of the device and your leg.


* Leave the dressing on, until removed by office staff.





CARE OF YOUR WRIST INCISION;





* Bruising or purple discoloration of the skin near the puncture site is common.


* You may shower.


* DO NOT submerge wrist.


* Leave dressing on, until removed by office staff..











JOHN SY MD Sep 26, 2018 09:15

## 2018-11-27 ENCOUNTER — HOSPITAL ENCOUNTER (OUTPATIENT)
Dept: HOSPITAL 75 - RAD | Age: 53
End: 2018-11-27
Attending: FAMILY MEDICINE
Payer: COMMERCIAL

## 2018-11-27 DIAGNOSIS — E04.1: Primary | ICD-10-CM

## 2018-11-27 PROCEDURE — 76536 US EXAM OF HEAD AND NECK: CPT

## 2018-11-27 NOTE — DIAGNOSTIC IMAGING REPORT
INDICATION:   Thyroid nodule, followup.



TECHNIQUE: Grayscale sonographic images of the thyroid gland.



CORRELATION STUDY:  05/02/2018



FINDINGS:

RIGHT LOBE:  4.6 x 1.5 x 1.2 cm.  

Small hypoechoic nodule superior pole 5 x 4 x 3 mm. Previously 4

x 3 x 3 mm, relatively stable to perhaps minimally increased.



LEFT LOBE:  4.5 x 1.3 x 0.9 cm.  

There is normal echotexture about the left lobe.



Isthmus appears unremarkable.



IMPRESSION:

Overall generally stable to perhaps very minimally increased size

of a small hypoechoic nodule superior pole right lobe.



(Normal gland size:  4-5 x 2 x 2 cm)



Dictated by: 



  Dictated on workstation # [id number]

## 2020-01-20 ENCOUNTER — HOSPITAL ENCOUNTER (OUTPATIENT)
Dept: HOSPITAL 75 - RAD | Age: 55
End: 2020-01-20
Attending: OBSTETRICS & GYNECOLOGY
Payer: COMMERCIAL

## 2020-01-20 ENCOUNTER — HOSPITAL ENCOUNTER (OUTPATIENT)
Dept: HOSPITAL 75 - RAD | Age: 55
End: 2020-01-20
Attending: NURSE PRACTITIONER
Payer: COMMERCIAL

## 2020-01-20 DIAGNOSIS — Z12.31: Primary | ICD-10-CM

## 2020-01-20 DIAGNOSIS — E04.1: Primary | ICD-10-CM

## 2020-01-20 PROCEDURE — 76536 US EXAM OF HEAD AND NECK: CPT

## 2020-01-20 PROCEDURE — 77067 SCR MAMMO BI INCL CAD: CPT

## 2020-01-20 NOTE — DIAGNOSTIC IMAGING REPORT
INDICATION: Routine screening.



Comparison is made with prior mammogram from 5/2/2018 and

3/11/2015.



2-D and 3-D bilateral screening mammography was performed with

CAD.



Patient has bilateral subpectoral breast implants. Implant

contours are smooth. Breast parenchyma is heterogeneously dense,

limiting the sensitivity of mammography. No mass or

malignant-appearing microcalcifications are seen. The axillae are

unremarkable.



IMPRESSION: BI-RADS Category 2



No mammographic features suspicious for malignancy are

identified.



ACR BI-RADS Category 2: Benign findings.

Result letter will be mailed to the patient.

Note: At least 10% of breast cancer is not imaged by mammography.



Dictated by: 



  Dictated on workstation # [id number]

## 2020-01-20 NOTE — DIAGNOSTIC IMAGING REPORT
PROCEDURE: US Thyroid.



TECHNIQUE: Multiple real-time grayscale images were obtained of

the thyroid in various projections.



INDICATION:  Thyroid nodule, follow-up.



COMPARISON: Correlation is made with prior thyroid ultrasound

from 11/27/2018.



FINDINGS: Right lobe of thyroid measures 4.8 x 1.3 x 1.2 cm and

the left lobe measures 4.1 x 1.2 x 1.1 cm. Isthmus is 1 mm in

thickness. Tiny nodule upper pole right lobe of the thyroid is

stable at approximately 4 mm x 2 mm x 3 mm. No new thyroid mass

is detected. No dominant mass is seen.



IMPRESSION: Stable tiny right upper pole thyroid nodule when

compared with examination from 11/27/2018.



Dictated by: 



  Dictated on workstation # [id number]

## 2020-08-17 ENCOUNTER — APPOINTMENT (RX ONLY)
Dept: URBAN - METROPOLITAN AREA CLINIC 51 | Facility: CLINIC | Age: 55
Setting detail: DERMATOLOGY
End: 2020-08-17

## 2020-08-17 DIAGNOSIS — L71.0 PERIORAL DERMATITIS: ICD-10-CM

## 2020-08-17 DIAGNOSIS — L82.1 OTHER SEBORRHEIC KERATOSIS: ICD-10-CM

## 2020-08-17 PROCEDURE — ? TREATMENT REGIMEN

## 2020-08-17 PROCEDURE — ? LIQUID NITROGEN

## 2020-08-17 PROCEDURE — ? COUNSELING

## 2020-08-17 PROCEDURE — 17110 DESTRUCTION B9 LES UP TO 14: CPT

## 2020-08-17 PROCEDURE — 99202 OFFICE O/P NEW SF 15 MIN: CPT | Mod: 25

## 2020-08-17 PROCEDURE — ? MEDICATION COUNSELING

## 2020-08-17 PROCEDURE — ? PRESCRIPTION

## 2020-08-17 RX ORDER — MINOCYCLINE HYDROCHLORIDE 100 MG/1
CAPSULE ORAL QD
Qty: 30 | Refills: 1 | Status: ERX | COMMUNITY
Start: 2020-08-17

## 2020-08-17 RX ADMIN — MINOCYCLINE HYDROCHLORIDE 1: 100 CAPSULE ORAL at 00:00

## 2020-08-17 ASSESSMENT — LOCATION SIMPLE DESCRIPTION DERM
LOCATION SIMPLE: UPPER BACK
LOCATION SIMPLE: LEFT UPPER BACK
LOCATION SIMPLE: LEFT LOWER BACK
LOCATION SIMPLE: LEFT CHEEK
LOCATION SIMPLE: RIGHT LOWER BACK
LOCATION SIMPLE: RIGHT UPPER BACK

## 2020-08-17 ASSESSMENT — LOCATION DETAILED DESCRIPTION DERM
LOCATION DETAILED: INFERIOR THORACIC SPINE
LOCATION DETAILED: LEFT MID-UPPER BACK
LOCATION DETAILED: LEFT INFERIOR MEDIAL MALAR CHEEK
LOCATION DETAILED: RIGHT MEDIAL UPPER BACK
LOCATION DETAILED: LEFT SUPERIOR UPPER BACK
LOCATION DETAILED: RIGHT INFERIOR LATERAL UPPER BACK
LOCATION DETAILED: LEFT SUPERIOR LATERAL UPPER BACK
LOCATION DETAILED: RIGHT SUPERIOR LATERAL MIDBACK
LOCATION DETAILED: RIGHT LATERAL UPPER BACK
LOCATION DETAILED: LEFT SUPERIOR LATERAL LOWER BACK
LOCATION DETAILED: RIGHT MID-UPPER BACK
LOCATION DETAILED: LEFT LATERAL UPPER BACK
LOCATION DETAILED: LEFT INFERIOR LATERAL UPPER BACK

## 2020-08-17 ASSESSMENT — PAIN INTENSITY VAS: HOW INTENSE IS YOUR PAIN 0 BEING NO PAIN, 10 BEING THE MOST SEVERE PAIN POSSIBLE?: NO PAIN

## 2020-08-17 ASSESSMENT — LOCATION ZONE DERM
LOCATION ZONE: FACE
LOCATION ZONE: TRUNK

## 2020-08-17 ASSESSMENT — INVESTIGATOR STATIC GLOBAL ASSESSMENT: IN YOUR EXPERIENCE, AMONG ALL PATIENTS YOU HAVE SEEN WITH THIS CONDITION, HOW SEVERE IS THIS PATIENT'S CONDITION?: MILD

## 2020-08-17 NOTE — PROCEDURE: LIQUID NITROGEN
Medical Necessity Information: It is in your best interest to select a reason for this procedure from the list below. All of these items fulfill various CMS LCD requirements except the new and changing color options.
Medical Necessity Clause: This procedure was medically necessary because the lesions that were treated were:
Duration Of Freeze Thaw-Cycle (Seconds): 0
Post-Care Instructions: I reviewed with the patient in detail post-care instructions. Patient is to wear sunprotection, and avoid picking at any of the treated lesions. Pt may apply Vaseline to crusted or scabbing areas.
Include Z78.9 (Other Specified Conditions Influencing Health Status) As An Associated Diagnosis?: No
Detail Level: Detailed
Bill Insurance (You Assume Risk Of Denial Or Audit By Selecting Yes): Yes
Consent: The patient's consent was obtained including but not limited to risks of crusting, scabbing, blistering, scarring, darker or lighter pigmentary change, recurrence, incomplete removal and infection.

## 2020-08-17 NOTE — PROCEDURE: MEDICATION COUNSELING
Ilumya Pregnancy And Lactation Text: The risk during pregnancy and breastfeeding is uncertain with this medication.
Tremfya Counseling: I discussed with the patient the risks of guselkumab including but not limited to immunosuppression, serious infections, worsening of inflammatory bowel disease and drug reactions.  The patient understands that monitoring is required including a PPD at baseline and must alert us or the primary physician if symptoms of infection or other concerning signs are noted.
Bactrim Counseling:  I discussed with the patient the risks of sulfa antibiotics including but not limited to GI upset, allergic reaction, drug rash, diarrhea, dizziness, photosensitivity, and yeast infections.  Rarely, more serious reactions can occur including but not limited to aplastic anemia, agranulocytosis, methemoglobinemia, blood dyscrasias, liver or kidney failure, lung infiltrates or desquamative/blistering drug rashes.
Hydroxychloroquine Counseling:  I discussed with the patient that a baseline ophthalmologic exam is needed at the start of therapy and every year thereafter while on therapy. A CBC may also be warranted for monitoring.  The side effects of this medication were discussed with the patient, including but not limited to agranulocytosis, aplastic anemia, seizures, rashes, retinopathy, and liver toxicity. Patient instructed to call the office should any adverse effect occur.  The patient verbalized understanding of the proper use and possible adverse effects of Plaquenil.  All the patient's questions and concerns were addressed.
Itraconazole Pregnancy And Lactation Text: This medication is Pregnancy Category C and it isn't know if it is safe during pregnancy. It is also excreted in breast milk.
Calcipotriene Pregnancy And Lactation Text: This medication has not been proven safe during pregnancy. It is unknown if this medication is excreted in breast milk.
Spironolactone Pregnancy And Lactation Text: This medication can cause feminization of the male fetus and should be avoided during pregnancy. The active metabolite is also found in breast milk.
Rhofade Pregnancy And Lactation Text: This medication has not been assigned a Pregnancy Risk Category. It is unknown if the medication is excreted in breast milk.
Prednisone Counseling:  I discussed with the patient the risks of prolonged use of prednisone including but not limited to weight gain, insomnia, osteoporosis, mood changes, diabetes, susceptibility to infection, glaucoma and high blood pressure.  In cases where prednisone use is prolonged, patients should be monitored with blood pressure checks, serum glucose levels and an eye exam.  Additionally, the patient may need to be placed on GI prophylaxis, PCP prophylaxis, and calcium and vitamin D supplementation and/or a bisphosphonate.  The patient verbalized understanding of the proper use and the possible adverse effects of prednisone.  All of the patient's questions and concerns were addressed.
Picato Counseling:  I discussed with the patient the risks of Picato including but not limited to erythema, scaling, itching, weeping, crusting, and pain.
Opioid Pregnancy And Lactation Text: These medications can lead to premature delivery and should be avoided during pregnancy. These medications are also present in breast milk in small amounts.
Rhofade Counseling: Rhofade is a topical medication which can decrease superficial blood flow where applied. Side effects are uncommon and include stinging, redness and allergic reactions.
Albendazole Pregnancy And Lactation Text: This medication is Pregnancy Category C and it isn't known if it is safe during pregnancy. It is also excreted in breast milk.
Azithromycin Pregnancy And Lactation Text: This medication is considered safe during pregnancy and is also secreted in breast milk.
Metronidazole Pregnancy And Lactation Text: This medication is Pregnancy Category B and considered safe during pregnancy.  It is also excreted in breast milk.
Hydroxychloroquine Pregnancy And Lactation Text: This medication has been shown to cause fetal harm but it isn't assigned a Pregnancy Risk Category. There are small amounts excreted in breast milk.
Ketoconazole Counseling:   Patient counseled regarding improving absorption with orange juice.  Adverse effects include but are not limited to breast enlargement, headache, diarrhea, nausea, upset stomach, liver function test abnormalities, taste disturbance, and stomach pain.  There is a rare possibility of liver failure that can occur when taking ketoconazole. The patient understands that monitoring of LFTs may be required, especially at baseline. The patient verbalized understanding of the proper use and possible adverse effects of ketoconazole.  All of the patient's questions and concerns were addressed.
SSKI Counseling:  I discussed with the patient the risks of SSKI including but not limited to thyroid abnormalities, metallic taste, GI upset, fever, headache, acne, arthralgias, paraesthesias, lymphadenopathy, easy bleeding, arrhythmias, and allergic reaction.
Solaraze Counseling:  I discussed with the patient the risks of Solaraze including but not limited to erythema, scaling, itching, weeping, crusting, and pain.
Protopic Counseling: Patient may experience a mild burning sensation during topical application. Protopic is not approved in children less than 2 years of age. There have been case reports of hematologic and skin malignancies in patients using topical calcineurin inhibitors although causality is questionable.
Infliximab Counseling:  I discussed with the patient the risks of infliximab including but not limited to myelosuppression, immunosuppression, autoimmune hepatitis, demyelinating diseases, lymphoma, and serious infections.  The patient understands that monitoring is required including a PPD at baseline and must alert us or the primary physician if symptoms of infection or other concerning signs are noted.
Ivermectin Counseling:  Patient instructed to take medication on an empty stomach with a full glass of water.  Patient informed of potential adverse effects including but not limited to nausea, diarrhea, dizziness, itching, and swelling of the extremities or lymph nodes.  The patient verbalized understanding of the proper use and possible adverse effects of ivermectin.  All of the patient's questions and concerns were addressed.
Prednisone Pregnancy And Lactation Text: This medication is Pregnancy Category C and it isn't know if it is safe during pregnancy. This medication is excreted in breast milk.
5-Fu Counseling: 5-Fluorouracil Counseling:  I discussed with the patient the risks of 5-fluorouracil including but not limited to erythema, scaling, itching, weeping, crusting, and pain.
Picato Pregnancy And Lactation Text: This medication is Pregnancy Category C. It is unknown if this medication is excreted in breast milk.
Minocycline Counseling: Patient advised regarding possible photosensitivity and discoloration of the teeth, skin, lips, tongue and gums.  Patient instructed to avoid sunlight, if possible.  When exposed to sunlight, patients should wear protective clothing, sunglasses, and sunscreen.  The patient was instructed to call the office immediately if the following severe adverse effects occur:  hearing changes, easy bruising/bleeding, severe headache, or vision changes.  The patient verbalized understanding of the proper use and possible adverse effects of minocycline.  All of the patient's questions and concerns were addressed.
Arava Pregnancy And Lactation Text: This medication is Pregnancy Category X and is absolutely contraindicated during pregnancy. It is unknown if it is excreted in breast milk.
5-Fu Pregnancy And Lactation Text: This medication is Pregnancy Category X and contraindicated in pregnancy and in women who may become pregnant. It is unknown if this medication is excreted in breast milk.
Solaraze Pregnancy And Lactation Text: This medication is Pregnancy Category B and is considered safe. There is some data to suggest avoiding during the third trimester. It is unknown if this medication is excreted in breast milk.
Niacinamide Counseling: I recommended taking niacin or niacinamide, also know as vitamin B3, twice daily. Recent evidence suggests that taking vitamin B3 (500 mg twice daily) can reduce the risk of actinic keratoses and non-melanoma skin cancers. Side effects of vitamin B3 include flushing and headache.
Sski Pregnancy And Lactation Text: This medication is Pregnancy Category D and isn't considered safe during pregnancy. It is excreted in breast milk.
Xeljanz Counseling: I discussed with the patient the risks of Xeljanz therapy including increased risk of infection, liver issues, headache, diarrhea, or cold symptoms. Live vaccines should be avoided. They were instructed to call if they have any problems.
Ketoconazole Pregnancy And Lactation Text: This medication is Pregnancy Category C and it isn't know if it is safe during pregnancy. It is also excreted in breast milk and breast feeding isn't recommended.
Arava Counseling:  Patient counseled regarding adverse effects of Arava including but not limited to nausea, vomiting, abnormalities in liver function tests. Patients may develop mouth sores, rash, diarrhea, and abnormalities in blood counts. The patient understands that monitoring is required including LFTs and blood counts.  There is a rare possibility of scarring of the liver and lung problems that can occur when taking methotrexate. Persistent nausea, loss of appetite, pale stools, dark urine, cough, and shortness of breath should be reported immediately. Patient advised to discontinue Arava treatment and consult with a physician prior to attempting conception. The patient will have to undergo a treatment to eliminate Arava from the body prior to conception.
Bactrim Pregnancy And Lactation Text: This medication is Pregnancy Category D and is known to cause fetal risk.  It is also excreted in breast milk.
Minocycline Pregnancy And Lactation Text: This medication is Pregnancy Category D and not consider safe during pregnancy. It is also excreted in breast milk.
Acitretin Pregnancy And Lactation Text: This medication is Pregnancy Category X and should not be given to women who are pregnant or may become pregnant in the future. This medication is excreted in breast milk.
Terbinafine Counseling: Patient counseling regarding adverse effects of terbinafine including but not limited to headache, diarrhea, rash, upset stomach, liver function test abnormalities, itching, taste/smell disturbance, nausea, abdominal pain, and flatulence.  There is a rare possibility of liver failure that can occur when taking terbinafine.  The patient understands that a baseline LFT and kidney function test may be required. The patient verbalized understanding of the proper use and possible adverse effects of terbinafine.  All of the patient's questions and concerns were addressed.
Topical Retinoid counseling:  Patient advised to apply a pea-sized amount only at bedtime and wait 30 minutes after washing their face before applying.  If too drying, patient may add a non-comedogenic moisturizer. The patient verbalized understanding of the proper use and possible adverse effects of retinoids.  All of the patient's questions and concerns were addressed.
Cephalexin Pregnancy And Lactation Text: This medication is Pregnancy Category B and considered safe during pregnancy.  It is also excreted in breast milk but can be used safely for shorter doses.
Xelsandraz Pregnancy And Lactation Text: This medication is Pregnancy Category D and is not considered safe during pregnancy.  The risk during breast feeding is also uncertain.
Drysol Counseling:  I discussed with the patient the risks of drysol/aluminum chloride including but not limited to skin rash, itching, irritation, burning.
Infliximab Pregnancy And Lactation Text: This medication is Pregnancy Category B and is considered safe during pregnancy. It is unknown if this medication is excreted in breast milk.
Niacinamide Pregnancy And Lactation Text: These medications are considered safe during pregnancy.
Thalidomide Counseling: I discussed with the patient the risks of thalidomide including but not limited to birth defects, anxiety, weakness, chest pain, dizziness, cough and severe allergy.
Protopic Pregnancy And Lactation Text: This medication is Pregnancy Category C. It is unknown if this medication is excreted in breast milk when applied topically.
Cephalexin Counseling: I counseled the patient regarding use of cephalexin as an antibiotic for prophylactic and/or therapeutic purposes. Cephalexin (commonly prescribed under brand name Keflex) is a cephalosporin antibiotic which is active against numerous classes of bacteria, including most skin bacteria. Side effects may include nausea, diarrhea, gastrointestinal upset, rash, hives, yeast infections, and in rare cases, hepatitis, kidney disease, seizures, fever, confusion, neurologic symptoms, and others. Patients with severe allergies to penicillin medications are cautioned that there is about a 10% incidence of cross-reactivity with cephalosporins. When possible, patients with penicillin allergies should use alternatives to cephalosporins for antibiotic therapy.
Quinolones Counseling:  I discussed with the patient the risks of fluoroquinolones including but not limited to GI upset, allergic reaction, drug rash, diarrhea, dizziness, photosensitivity, yeast infections, liver function test abnormalities, tendonitis/tendon rupture.
Acitretin Counseling:  I discussed with the patient the risks of acitretin including but not limited to hair loss, dry lips/skin/eyes, liver damage, hyperlipidemia, depression/suicidal ideation, photosensitivity.  Serious rare side effects can include but are not limited to pancreatitis, pseudotumor cerebri, bony changes, clot formation/stroke/heart attack.  Patient understands that alcohol is contraindicated since it can result in liver toxicity and significantly prolong the elimination of the drug by many years.
Xolair Counseling:  Patient informed of potential adverse effects including but not limited to fever, muscle aches, rash and allergic reactions.  The patient verbalized understanding of the proper use and possible adverse effects of Xolair.  All of the patient's questions and concerns were addressed.
Nsaids Counseling: NSAID Counseling: I discussed with the patient that NSAIDs should be taken with food. Prolonged use of NSAIDs can result in the development of stomach ulcers.  Patient advised to stop taking NSAIDs if abdominal pain occurs.  The patient verbalized understanding of the proper use and possible adverse effects of NSAIDs.  All of the patient's questions and concerns were addressed.
Terbinafine Pregnancy And Lactation Text: This medication is Pregnancy Category B and is considered safe during pregnancy. It is also excreted in breast milk and breast feeding isn't recommended.
Rituxan Counseling:  I discussed with the patient the risks of Rituxan infusions. Side effects can include infusion reactions, severe drug rashes including mucocutaneous reactions, reactivation of latent hepatitis and other infections and rarely progressive multifocal leukoencephalopathy.  All of the patient's questions and concerns were addressed.
Drysol Pregnancy And Lactation Text: This medication is considered safe during pregnancy and breast feeding.
Clofazimine Counseling:  I discussed with the patient the risks of clofazimine including but not limited to skin and eye pigmentation, liver damage, nausea/vomiting, gastrointestinal bleeding and allergy.
Bexarotene Pregnancy And Lactation Text: This medication is Pregnancy Category X and should not be given to women who are pregnant or may become pregnant. This medication should not be used if you are breast feeding.
Colchicine Counseling:  Patient counseled regarding adverse effects including but not limited to stomach upset (nausea, vomiting, stomach pain, or diarrhea).  Patient instructed to limit alcohol consumption while taking this medication.  Colchicine may reduce blood counts especially with prolonged use.  The patient understands that monitoring of kidney function and blood counts may be required, especially at baseline. The patient verbalized understanding of the proper use and possible adverse effects of colchicine.  All of the patient's questions and concerns were addressed.
Rifampin Pregnancy And Lactation Text: This medication is Pregnancy Category C and it isn't know if it is safe during pregnancy. It is also excreted in breast milk and should not be used if you are breast feeding.
Tranexamic Acid Counseling:  Patient advised of the small risk of bleeding problems with tranexamic acid. They were also instructed to call if they developed any nausea, vomiting or diarrhea. All of the patient's questions and concerns were addressed.
Rituxan Pregnancy And Lactation Text: This medication is Pregnancy Category C and it isn't know if it is safe during pregnancy. It is unknown if this medication is excreted in breast milk but similar antibodies are known to be excreted.
Xolair Pregnancy And Lactation Text: This medication is Pregnancy Category B and is considered safe during pregnancy. This medication is excreted in breast milk.
Nsaids Pregnancy And Lactation Text: These medications are considered safe up to 30 weeks gestation. It is excreted in breast milk.
Clofazimine Pregnancy And Lactation Text: This medication is Pregnancy Category C and isn't considered safe during pregnancy. It is excreted in breast milk.
Rifampin Counseling: I discussed with the patient the risks of rifampin including but not limited to liver damage, kidney damage, red-orange body fluids, nausea/vomiting and severe allergy.
Elidel Counseling: Patient may experience a mild burning sensation during topical application. Elidel is not approved in children less than 2 years of age. There have been case reports of hematologic and skin malignancies in patients using topical calcineurin inhibitors although causality is questionable.
Tazorac Counseling:  Patient advised that medication is irritating and drying.  Patient may need to apply sparingly and wash off after an hour before eventually leaving it on overnight.  The patient verbalized understanding of the proper use and possible adverse effects of tazorac.  All of the patient's questions and concerns were addressed.
Bexarotene Counseling:  I discussed with the patient the risks of bexarotene including but not limited to hair loss, dry lips/skin/eyes, liver abnormalities, hyperlipidemia, pancreatitis, depression/suicidal ideation, photosensitivity, drug rash/allergic reactions, hypothyroidism, anemia, leukopenia, infection, cataracts, and teratogenicity.  Patient understands that they will need regular blood tests to check lipid profile, liver function tests, white blood cell count, thyroid function tests and pregnancy test if applicable.
Isotretinoin Counseling: Patient should get monthly blood tests, not donate blood, not drive at night if vision affected, not share medication, and not undergo elective surgery for 6 months after tx completed. Side effects reviewed, pt to contact office should one occur.
Siliq Counseling:  I discussed with the patient the risks of Siliq including but not limited to new or worsening depression, suicidal thoughts and behavior, immunosuppression, malignancy, posterior leukoencephalopathy syndrome, and serious infections.  The patient understands that monitoring is required including a PPD at baseline and must alert us or the primary physician if symptoms of infection or other concerning signs are noted. There is also a special program designed to monitor depression which is required with Siliq.
Odomzo Counseling- I discussed with the patient the risks of Odomzo including but not limited to nausea, vomiting, diarrhea, constipation, weight loss, changes in the sense of taste, decreased appetite, muscle spasms, and hair loss.  The patient verbalized understanding of the proper use and possible adverse effects of Odomzo.  All of the patient's questions and concerns were addressed.
Tazorac Pregnancy And Lactation Text: This medication is not safe during pregnancy. It is unknown if this medication is excreted in breast milk.
Tranexamic Acid Pregnancy And Lactation Text: It is unknown if this medication is safe during pregnancy or breast feeding.
Cimzia Counseling:  I discussed with the patient the risks of Cimzia including but not limited to immunosuppression, allergic reactions and infections.  The patient understands that monitoring is required including a PPD at baseline and must alert us or the primary physician if symptoms of infection or other concerning signs are noted.
Dapsone Counseling: I discussed with the patient the risks of dapsone including but not limited to hemolytic anemia, agranulocytosis, rashes, methemoglobinemia, kidney failure, peripheral neuropathy, headaches, GI upset, and liver toxicity.  Patients who start dapsone require monitoring including baseline LFTs and weekly CBCs for the first month, then every month thereafter.  The patient verbalized understanding of the proper use and possible adverse effects of dapsone.  All of the patient's questions and concerns were addressed.
Valtrex Pregnancy And Lactation Text: this medication is Pregnancy Category B and is considered safe during pregnancy. This medication is not directly found in breast milk but it's metabolite acyclovir is present.
Cosentyx Counseling:  I discussed with the patient the risks of Cosentyx including but not limited to worsening of Crohn's disease, immunosuppression, allergic reactions and infections.  The patient understands that monitoring is required including a PPD at baseline and must alert us or the primary physician if symptoms of infection or other concerning signs are noted.
Isotretinoin Pregnancy And Lactation Text: This medication is Pregnancy Category X and is considered extremely dangerous during pregnancy. It is unknown if it is excreted in breast milk.
Azathioprine Counseling:  I discussed with the patient the risks of azathioprine including but not limited to myelosuppression, immunosuppression, hepatotoxicity, lymphoma, and infections.  The patient understands that monitoring is required including baseline LFTs, Creatinine, possible TPMP genotyping and weekly CBCs for the first month and then every 2 weeks thereafter.  The patient verbalized understanding of the proper use and possible adverse effects of azathioprine.  All of the patient's questions and concerns were addressed.
Cimzia Pregnancy And Lactation Text: This medication crosses the placenta but can be considered safe in certain situations. Cimzia may be excreted in breast milk.
Eucrisa Counseling: Patient may experience a mild burning sensation during topical application. Eucrisa is not approved in children less than 2 years of age.
Topical Clindamycin Counseling: Patient counseled that this medication may cause skin irritation or allergic reactions.  In the event of skin irritation, the patient was advised to reduce the amount of the drug applied or use it less frequently.   The patient verbalized understanding of the proper use and possible adverse effects of clindamycin.  All of the patient's questions and concerns were addressed.
Valtrex Counseling: I discussed with the patient the risks of valacyclovir including but not limited to kidney damage, nausea, vomiting and severe allergy.  The patient understands that if the infection seems to be worsening or is not improving, they are to call.
Sarecycline Counseling: Patient advised regarding possible photosensitivity and discoloration of the teeth, skin, lips, tongue and gums.  Patient instructed to avoid sunlight, if possible.  When exposed to sunlight, patients should wear protective clothing, sunglasses, and sunscreen.  The patient was instructed to call the office immediately if the following severe adverse effects occur:  hearing changes, easy bruising/bleeding, severe headache, or vision changes.  The patient verbalized understanding of the proper use and possible adverse effects of sarecycline.  All of the patient's questions and concerns were addressed.
Hydroquinone Counseling:  Patient advised that medication may result in skin irritation, lightening (hypopigmentation), dryness, and burning.  In the event of skin irritation, the patient was advised to reduce the amount of the drug applied or use it less frequently.  Rarely, spots that are treated with hydroquinone can become darker (pseudoochronosis).  Should this occur, patient instructed to stop medication and call the office. The patient verbalized understanding of the proper use and possible adverse effects of hydroquinone.  All of the patient's questions and concerns were addressed.
Use Enhanced Medication Counseling?: No
Cimetidine Counseling:  I discussed with the patient the risks of Cimetidine including but not limited to gynecomastia, headache, diarrhea, nausea, drowsiness, arrhythmias, pancreatitis, skin rashes, psychosis, bone marrow suppression and kidney toxicity.
Dapsone Pregnancy And Lactation Text: This medication is Pregnancy Category C and is not considered safe during pregnancy or breast feeding.
Topical Clindamycin Pregnancy And Lactation Text: This medication is Pregnancy Category B and is considered safe during pregnancy. It is unknown if it is excreted in breast milk.
High Dose Vitamin A Counseling: Side effects reviewed, pt to contact office should one occur.
Detail Level: Zone
Otezla Counseling: The side effects of Otezla were discussed with the patient, including but not limited to worsening or new depression, weight loss, diarrhea, nausea, upper respiratory tract infection, and headache. Patient instructed to call the office should any adverse effect occur.  The patient verbalized understanding of the proper use and possible adverse effects of Otezla.  All the patient's questions and concerns were addressed.
Azathioprine Pregnancy And Lactation Text: This medication is Pregnancy Category D and isn't considered safe during pregnancy. It is unknown if this medication is excreted in breast milk.
Eucrisa Pregnancy And Lactation Text: This medication has not been assigned a Pregnancy Risk Category but animal studies failed to show danger with the topical medication. It is unknown if the medication is excreted in breast milk.
Dupixent Counseling: I discussed with the patient the risks of dupilumab including but not limited to eye infection and irritation, cold sores, injection site reactions, worsening of asthma, allergic reactions and increased risk of parasitic infection.  Live vaccines should be avoided while taking dupilumab. Dupilumab will also interact with certain medications such as warfarin and cyclosporine. The patient understands that monitoring is required and they must alert us or the primary physician if symptoms of infection or other concerning signs are noted.
Erivedge Counseling- I discussed with the patient the risks of Erivedge including but not limited to nausea, vomiting, diarrhea, constipation, weight loss, changes in the sense of taste, decreased appetite, muscle spasms, and hair loss.  The patient verbalized understanding of the proper use and possible adverse effects of Erivedge.  All of the patient's questions and concerns were addressed.
High Dose Vitamin A Pregnancy And Lactation Text: High dose vitamin A therapy is contraindicated during pregnancy and breast feeding.
Topical Sulfur Applications Pregnancy And Lactation Text: This medication is Pregnancy Category C and has an unknown safety profile during pregnancy. It is unknown if this topical medication is excreted in breast milk.
Topical Sulfur Applications Counseling: Topical Sulfur Counseling: Patient counseled that this medication may cause skin irritation or allergic reactions.  In the event of skin irritation, the patient was advised to reduce the amount of the drug applied or use it less frequently.   The patient verbalized understanding of the proper use and possible adverse effects of topical sulfur application.  All of the patient's questions and concerns were addressed.
Simponi Counseling:  I discussed with the patient the risks of golimumab including but not limited to myelosuppression, immunosuppression, autoimmune hepatitis, demyelinating diseases, lymphoma, and serious infections.  The patient understands that monitoring is required including a PPD at baseline and must alert us or the primary physician if symptoms of infection or other concerning signs are noted.
Cellcept Counseling:  I discussed with the patient the risks of mycophenolate mofetil including but not limited to infection/immunosuppression, GI upset, hypokalemia, hypercholesterolemia, bone marrow suppression, lymphoproliferative disorders, malignancy, GI ulceration/bleed/perforation, colitis, interstitial lung disease, kidney failure, progressive multifocal leukoencephalopathy, and birth defects.  The patient understands that monitoring is required including a baseline creatinine and regular CBC testing. In addition, patient must alert us immediately if symptoms of infection or other concerning signs are noted.
Clindamycin Counseling: I counseled the patient regarding use of clindamycin as an antibiotic for prophylactic and/or therapeutic purposes. Clindamycin is active against numerous classes of bacteria, including skin bacteria. Side effects may include nausea, diarrhea, gastrointestinal upset, rash, hives, yeast infections, and in rare cases, colitis.
Tetracycline Counseling: Patient counseled regarding possible photosensitivity and increased risk for sunburn.  Patient instructed to avoid sunlight, if possible.  When exposed to sunlight, patients should wear protective clothing, sunglasses, and sunscreen.  The patient was instructed to call the office immediately if the following severe adverse effects occur:  hearing changes, easy bruising/bleeding, severe headache, or vision changes.  The patient verbalized understanding of the proper use and possible adverse effects of tetracycline.  All of the patient's questions and concerns were addressed. Patient understands to avoid pregnancy while on therapy due to potential birth defects.
Otezla Pregnancy And Lactation Text: This medication is Pregnancy Category C and it isn't known if it is safe during pregnancy. It is unknown if it is excreted in breast milk.
Wartpeel Counseling:  I discussed with the patient the risks of Wartpeel including but not limited to erythema, scaling, itching, weeping, crusting, and pain.
Imiquimod Counseling:  I discussed with the patient the risks of imiquimod including but not limited to erythema, scaling, itching, weeping, crusting, and pain.  Patient understands that the inflammatory response to imiquimod is variable from person to person and was educated regarded proper titration schedule.  If flu-like symptoms develop, patient knows to discontinue the medication and contact us.
Cyclophosphamide Counseling:  I discussed with the patient the risks of cyclophosphamide including but not limited to hair loss, hormonal abnormalities, decreased fertility, abdominal pain, diarrhea, nausea and vomiting, bone marrow suppression and infection. The patient understands that monitoring is required while taking this medication.
Doxepin Counseling:  Patient advised that the medication is sedating and not to drive a car after taking this medication. Patient informed of potential adverse effects including but not limited to dry mouth, urinary retention, and blurry vision.  The patient verbalized understanding of the proper use and possible adverse effects of doxepin.  All of the patient's questions and concerns were addressed.
Oxybutynin Counseling:  I discussed with the patient the risks of oxybutynin including but not limited to skin rash, drowsiness, dry mouth, difficulty urinating, and blurred vision.
Clindamycin Pregnancy And Lactation Text: This medication can be used in pregnancy if certain situations. Clindamycin is also present in breast milk.
Enbrel Counseling:  I discussed with the patient the risks of etanercept including but not limited to myelosuppression, immunosuppression, autoimmune hepatitis, demyelinating diseases, lymphoma, and infections.  The patient understands that monitoring is required including a PPD at baseline and must alert us or the primary physician if symptoms of infection or other concerning signs are noted.
Doxepin Pregnancy And Lactation Text: This medication is Pregnancy Category C and it isn't known if it is safe during pregnancy. It is also excreted in breast milk and breast feeding isn't recommended.
Cyclophosphamide Pregnancy And Lactation Text: This medication is Pregnancy Category D and it isn't considered safe during pregnancy. This medication is excreted in breast milk.
Doxycycline Counseling:  Patient counseled regarding possible photosensitivity and increased risk for sunburn.  Patient instructed to avoid sunlight, if possible.  When exposed to sunlight, patients should wear protective clothing, sunglasses, and sunscreen.  The patient was instructed to call the office immediately if the following severe adverse effects occur:  hearing changes, easy bruising/bleeding, severe headache, or vision changes.  The patient verbalized understanding of the proper use and possible adverse effects of doxycycline.  All of the patient's questions and concerns were addressed.
Benzoyl Peroxide Counseling: Patient counseled that medicine may cause skin irritation and bleach clothing.  In the event of skin irritation, the patient was advised to reduce the amount of the drug applied or use it less frequently.   The patient verbalized understanding of the proper use and possible adverse effects of benzoyl peroxide.  All of the patient's questions and concerns were addressed.
Skyrizi Counseling: I discussed with the patient the risks of risankizumab-rzaa including but not limited to immunosuppression, and serious infections.  The patient understands that monitoring is required including a PPD at baseline and must alert us or the primary physician if symptoms of infection or other concerning signs are noted.
Finasteride Counseling:  I discussed with the patient the risks of use of finasteride including but not limited to decreased libido, decreased ejaculate volume, gynecomastia, and depression. Women should not handle medication.  All of the patient's questions and concerns were addressed.
Dupixent Pregnancy And Lactation Text: This medication likely crosses the placenta but the risk for the fetus is uncertain. This medication is excreted in breast milk.
Cyclosporine Counseling:  I discussed with the patient the risks of cyclosporine including but not limited to hypertension, gingival hyperplasia,myelosuppression, immunosuppression, liver damage, kidney damage, neurotoxicity, lymphoma, and serious infections. The patient understands that monitoring is required including baseline blood pressure, CBC, CMP, lipid panel and uric acid, and then 1-2 times monthly CMP and blood pressure.
Zyclara Counseling:  I discussed with the patient the risks of imiquimod including but not limited to erythema, scaling, itching, weeping, crusting, and pain.  Patient understands that the inflammatory response to imiquimod is variable from person to person and was educated regarded proper titration schedule.  If flu-like symptoms develop, patient knows to discontinue the medication and contact us.
Propranolol Pregnancy And Lactation Text: This medication is Pregnancy Category C and it isn't known if it is safe during pregnancy. It is excreted in breast milk.
Stelara Counseling:  I discussed with the patient the risks of ustekinumab including but not limited to immunosuppression, malignancy, posterior leukoencephalopathy syndrome, and serious infections.  The patient understands that monitoring is required including a PPD at baseline and must alert us or the primary physician if symptoms of infection or other concerning signs are noted.
Minoxidil Counseling: Minoxidil is a topical medication which can increase blood flow where it is applied. It is uncertain how this medication increases hair growth. Side effects are uncommon and include stinging and allergic reactions.
Finasteride Pregnancy And Lactation Text: This medication is absolutely contraindicated during pregnancy. It is unknown if it is excreted in breast milk.
Hydroxyzine Counseling: Patient advised that the medication is sedating and not to drive a car after taking this medication.  Patient informed of potential adverse effects including but not limited to dry mouth, urinary retention, and blurry vision.  The patient verbalized understanding of the proper use and possible adverse effects of hydroxyzine.  All of the patient's questions and concerns were addressed.
Doxycycline Pregnancy And Lactation Text: This medication is Pregnancy Category D and not consider safe during pregnancy. It is also excreted in breast milk but is considered safe for shorter treatment courses.
Fluconazole Counseling:  Patient counseled regarding adverse effects of fluconazole including but not limited to headache, diarrhea, nausea, upset stomach, liver function test abnormalities, taste disturbance, and stomach pain.  There is a rare possibility of liver failure that can occur when taking fluconazole.  The patient understands that monitoring of LFTs and kidney function test may be required, especially at baseline. The patient verbalized understanding of the proper use and possible adverse effects of fluconazole.  All of the patient's questions and concerns were addressed.
Benzoyl Peroxide Pregnancy And Lactation Text: This medication is Pregnancy Category C. It is unknown if benzoyl peroxide is excreted in breast milk.
Propranolol Counseling:  I discussed with the patient the risks of propranolol including but not limited to low heart rate, low blood pressure, low blood sugar, restlessness and increased cold sensitivity. They should call the office if they experience any of these side effects.
Hydroxyzine Pregnancy And Lactation Text: This medication is not safe during pregnancy and should not be taken. It is also excreted in breast milk and breast feeding isn't recommended.
Erythromycin Counseling:  I discussed with the patient the risks of erythromycin including but not limited to GI upset, allergic reaction, drug rash, diarrhea, increase in liver enzymes, and yeast infections.
Carac Counseling:  I discussed with the patient the risks of Carac including but not limited to erythema, scaling, itching, weeping, crusting, and pain.
Gabapentin Counseling: I discussed with the patient the risks of gabapentin including but not limited to dizziness, somnolence, fatigue and ataxia.
Taltz Counseling: I discussed with the patient the risks of ixekizumab including but not limited to immunosuppression, serious infections, worsening of inflammatory bowel disease and drug reactions.  The patient understands that monitoring is required including a PPD at baseline and must alert us or the primary physician if symptoms of infection or other concerning signs are noted.
Glycopyrrolate Counseling:  I discussed with the patient the risks of glycopyrrolate including but not limited to skin rash, drowsiness, dry mouth, difficulty urinating, and blurred vision.
Griseofulvin Pregnancy And Lactation Text: This medication is Pregnancy Category X and is known to cause serious birth defects. It is unknown if this medication is excreted in breast milk but breast feeding should be avoided.
Birth Control Pills Pregnancy And Lactation Text: This medication should be avoided if pregnant and for the first 30 days post-partum.
Azithromycin Counseling:  I discussed with the patient the risks of azithromycin including but not limited to GI upset, allergic reaction, drug rash, diarrhea, and yeast infections.
Mirvaso Counseling: Mirvaso is a topical medication which can decrease superficial blood flow where applied. Side effects are uncommon and include stinging, redness and allergic reactions.
Methotrexate Counseling:  Patient counseled regarding adverse effects of methotrexate including but not limited to nausea, vomiting, abnormalities in liver function tests. Patients may develop mouth sores, rash, diarrhea, and abnormalities in blood counts. The patient understands that monitoring is required including LFT's and blood counts.  There is a rare possibility of scarring of the liver and lung problems that can occur when taking methotrexate. Persistent nausea, loss of appetite, pale stools, dark urine, cough, and shortness of breath should be reported immediately. Patient advised to discontinue methotrexate treatment at least three months before attempting to become pregnant.  I discussed the need for folate supplements while taking methotrexate.  These supplements can decrease side effects during methotrexate treatment. The patient verbalized understanding of the proper use and possible adverse effects of methotrexate.  All of the patient's questions and concerns were addressed.
Griseofulvin Counseling:  I discussed with the patient the risks of griseofulvin including but not limited to photosensitivity, cytopenia, liver damage, nausea/vomiting and severe allergy.  The patient understands that this medication is best absorbed when taken with a fatty meal (e.g., ice cream or french fries).
Humira Counseling:  I discussed with the patient the risks of adalimumab including but not limited to myelosuppression, immunosuppression, autoimmune hepatitis, demyelinating diseases, lymphoma, and serious infections.  The patient understands that monitoring is required including a PPD at baseline and must alert us or the primary physician if symptoms of infection or other concerning signs are noted.
Birth Control Pills Counseling: Birth Control Pill Counseling: I discussed with the patient the potential side effects of OCPs including but not limited to increased risk of stroke, heart attack, thrombophlebitis, deep venous thrombosis, hepatic adenomas, breast changes, GI upset, headaches, and depression.  The patient verbalized understanding of the proper use and possible adverse effects of OCPs. All of the patient's questions and concerns were addressed.
Erythromycin Pregnancy And Lactation Text: This medication is Pregnancy Category B and is considered safe during pregnancy. It is also excreted in breast milk.
Spironolactone Counseling: Patient advised regarding risks of diarrhea, abdominal pain, hyperkalemia, birth defects (for female patients), liver toxicity and renal toxicity. The patient may need blood work to monitor liver and kidney function and potassium levels while on therapy. The patient verbalized understanding of the proper use and possible adverse effects of spironolactone.  All of the patient's questions and concerns were addressed.
Itraconazole Counseling:  I discussed with the patient the risks of itraconazole including but not limited to liver damage, nausea/vomiting, neuropathy, and severe allergy.  The patient understands that this medication is best absorbed when taken with acidic beverages such as non-diet cola or ginger ale.  The patient understands that monitoring is required including baseline LFTs and repeat LFTs at intervals.  The patient understands that they are to contact us or the primary physician if concerning signs are noted.
Calcipotriene Counseling:  I discussed with the patient the risks of calcipotriene including but not limited to erythema, scaling, itching, and irritation.
Ilumya Counseling: I discussed with the patient the risks of tildrakizumab including but not limited to immunosuppression, malignancy, posterior leukoencephalopathy syndrome, and serious infections.  The patient understands that monitoring is required including a PPD at baseline and must alert us or the primary physician if symptoms of infection or other concerning signs are noted.
Opioid Counseling: I discussed with the patient the potential side effects of opioids including but not limited to addiction, altered mental status, and depression. I stressed avoiding alcohol, benzodiazepines, muscle relaxants and sleep aids unless specifically okayed by a physician. The patient verbalized understanding of the proper use and possible adverse effects of opioids. All of the patient's questions and concerns were addressed. They were instructed to flush the remaining pills down the toilet if they did not need them for pain.
Glycopyrrolate Pregnancy And Lactation Text: This medication is Pregnancy Category B and is considered safe during pregnancy. It is unknown if it is excreted breast milk.
Metronidazole Counseling:  I discussed with the patient the risks of metronidazole including but not limited to seizures, nausea/vomiting, a metallic taste in the mouth, nausea/vomiting and severe allergy.
Albendazole Counseling:  I discussed with the patient the risks of albendazole including but not limited to cytopenia, kidney damage, nausea/vomiting and severe allergy.  The patient understands that this medication is being used in an off-label manner.
Methotrexate Pregnancy And Lactation Text: This medication is Pregnancy Category X and is known to cause fetal harm. This medication is excreted in breast milk.

## 2021-01-18 RX ORDER — MINOCYCLINE HYDROCHLORIDE 100 MG/1
CAPSULE ORAL QD
Qty: 30 | Refills: 5 | Status: ERX

## 2021-01-26 ENCOUNTER — HOSPITAL ENCOUNTER (OUTPATIENT)
Dept: HOSPITAL 75 - RAD | Age: 56
End: 2021-01-26
Attending: OBSTETRICS & GYNECOLOGY
Payer: COMMERCIAL

## 2021-01-26 DIAGNOSIS — Z12.31: Primary | ICD-10-CM

## 2021-01-26 PROCEDURE — 77067 SCR MAMMO BI INCL CAD: CPT

## 2021-01-26 PROCEDURE — 77063 BREAST TOMOSYNTHESIS BI: CPT

## 2021-01-26 NOTE — DIAGNOSTIC IMAGING REPORT
INDICATION: 

Routine screening.



COMPARISON:     

01/20/2020 and 05/02/2018.



TECHNIQUE: 

2D and 3D bilateral screening mammography was performed with CAD.



FINDINGS:

Bilateral subpectoral breast implants are noted. The implant

contours are smooth. No evidence of implant rupture is

identified. Both breasts are heterogeneously dense, limiting the

sensitivity of mammography. The parenchymal pattern is stable. No

mass or malignant appearing microcalcifications are seen. The

axillae are unremarkable.



IMPRESSION:  

No mammographic features suspicious for malignancy are

identified.



ACR BI-RADS Category 2: Benign findings.

Result letter will be mailed to the patient.

Note: At least 10% of breast cancer is not imaged by mammography.



Dictated by: 



  Dictated on workstation # [id number]

## 2021-07-23 ENCOUNTER — HOSPITAL ENCOUNTER (INPATIENT)
Dept: HOSPITAL 75 - ER | Age: 56
LOS: 7 days | Discharge: HOME HEALTH | DRG: 177 | End: 2021-07-30
Attending: FAMILY MEDICINE | Admitting: FAMILY MEDICINE
Payer: COMMERCIAL

## 2021-07-23 VITALS — HEIGHT: 67.99 IN | BODY MASS INDEX: 21.92 KG/M2 | WEIGHT: 144.62 LBS

## 2021-07-23 VITALS — DIASTOLIC BLOOD PRESSURE: 64 MMHG | SYSTOLIC BLOOD PRESSURE: 120 MMHG

## 2021-07-23 DIAGNOSIS — F41.9: ICD-10-CM

## 2021-07-23 DIAGNOSIS — E78.5: ICD-10-CM

## 2021-07-23 DIAGNOSIS — R53.1: ICD-10-CM

## 2021-07-23 DIAGNOSIS — U07.1: Primary | ICD-10-CM

## 2021-07-23 DIAGNOSIS — J96.01: ICD-10-CM

## 2021-07-23 DIAGNOSIS — F17.290: ICD-10-CM

## 2021-07-23 DIAGNOSIS — J12.82: ICD-10-CM

## 2021-07-23 LAB
ALBUMIN SERPL-MCNC: 4.2 GM/DL (ref 3.2–4.5)
ALP SERPL-CCNC: 57 U/L (ref 40–136)
ALT SERPL-CCNC: 18 U/L (ref 0–55)
APTT BLD: 40 SEC (ref 24–35)
ARTERIAL PATENCY WRIST A: (no result)
BASE EXCESS STD BLDA CALC-SCNC: 2.7 MMOL/L (ref -2.5–2.5)
BASOPHILS # BLD AUTO: 0 10^3/UL (ref 0–0.1)
BASOPHILS NFR BLD AUTO: 0 % (ref 0–10)
BDY SITE: (no result)
BILIRUB SERPL-MCNC: 0.5 MG/DL (ref 0.1–1)
BODY TEMPERATURE: 100.9
BUN/CREAT SERPL: 11
CALCIUM SERPL-MCNC: 9.6 MG/DL (ref 8.5–10.1)
CHLORIDE SERPL-SCNC: 98 MMOL/L (ref 98–107)
CO2 BLDA CALC-SCNC: 26.6 MMOL/L (ref 21–31)
CO2 SERPL-SCNC: 29 MMOL/L (ref 21–32)
CREAT SERPL-MCNC: 0.75 MG/DL (ref 0.6–1.3)
EOSINOPHIL # BLD AUTO: 0 10^3/UL (ref 0–0.3)
EOSINOPHIL NFR BLD AUTO: 0 % (ref 0–10)
GFR SERPLBLD BASED ON 1.73 SQ M-ARVRAT: 80 ML/MIN
GLUCOSE SERPL-MCNC: 110 MG/DL (ref 70–105)
HCT VFR BLD CALC: 42 % (ref 35–52)
HGB BLD-MCNC: 13.7 G/DL (ref 11.5–16)
INHALED O2 FLOW RATE: (no result) L/MIN
INR PPP: 0.9 (ref 0.8–1.4)
LYMPHOCYTES # BLD AUTO: 0.5 10^3/UL (ref 1–4)
LYMPHOCYTES NFR BLD AUTO: 9 % (ref 12–44)
MANUAL DIFFERENTIAL PERFORMED BLD QL: NO
MCH RBC QN AUTO: 30 PG (ref 25–34)
MCHC RBC AUTO-ENTMCNC: 33 G/DL (ref 32–36)
MCV RBC AUTO: 91 FL (ref 80–99)
MONOCYTES # BLD AUTO: 0.4 10^3/UL (ref 0–1)
MONOCYTES NFR BLD AUTO: 7 % (ref 0–12)
NEUTROPHILS # BLD AUTO: 4.6 10^3/UL (ref 1.8–7.8)
NEUTROPHILS NFR BLD AUTO: 84 % (ref 42–75)
PCO2 BLDA: 34 MMHG (ref 35–45)
PH BLDA: 7.49 [PH] (ref 7.37–7.43)
PLATELET # BLD: 196 10^3/UL (ref 130–400)
PMV BLD AUTO: 9.6 FL (ref 9–12.2)
PO2 BLDA: 57 MMHG (ref 79–93)
POTASSIUM SERPL-SCNC: 3.9 MMOL/L (ref 3.6–5)
PROT SERPL-MCNC: 8.4 GM/DL (ref 6.4–8.2)
PROTHROMBIN TIME: 12.7 SEC (ref 12.2–14.7)
SAO2 % BLDA FROM PO2: 88 % (ref 94–100)
SODIUM SERPL-SCNC: 139 MMOL/L (ref 135–145)
VENTILATION MODE VENT: NO
WBC # BLD AUTO: 5.5 10^3/UL (ref 4.3–11)

## 2021-07-23 PROCEDURE — 94760 N-INVAS EAR/PLS OXIMETRY 1: CPT

## 2021-07-23 PROCEDURE — 85007 BL SMEAR W/DIFF WBC COUNT: CPT

## 2021-07-23 PROCEDURE — 84484 ASSAY OF TROPONIN QUANT: CPT

## 2021-07-23 PROCEDURE — 94761 N-INVAS EAR/PLS OXIMETRY MLT: CPT

## 2021-07-23 PROCEDURE — 82947 ASSAY GLUCOSE BLOOD QUANT: CPT

## 2021-07-23 PROCEDURE — 93005 ELECTROCARDIOGRAM TRACING: CPT

## 2021-07-23 PROCEDURE — 86141 C-REACTIVE PROTEIN HS: CPT

## 2021-07-23 PROCEDURE — 71045 X-RAY EXAM CHEST 1 VIEW: CPT

## 2021-07-23 PROCEDURE — XW033E5 INTRODUCTION OF REMDESIVIR ANTI-INFECTIVE INTO PERIPHERAL VEIN, PERCUTANEOUS APPROACH, NEW TECHNOLOGY GROUP 5: ICD-10-PCS | Performed by: EMERGENCY MEDICINE

## 2021-07-23 PROCEDURE — 87040 BLOOD CULTURE FOR BACTERIA: CPT

## 2021-07-23 PROCEDURE — 80053 COMPREHEN METABOLIC PANEL: CPT

## 2021-07-23 PROCEDURE — 85027 COMPLETE CBC AUTOMATED: CPT

## 2021-07-23 PROCEDURE — 8E0ZXY6 ISOLATION: ICD-10-PCS | Performed by: EMERGENCY MEDICINE

## 2021-07-23 PROCEDURE — 96374 THER/PROPH/DIAG INJ IV PUSH: CPT

## 2021-07-23 PROCEDURE — 85025 COMPLETE CBC W/AUTO DIFF WBC: CPT

## 2021-07-23 PROCEDURE — 85730 THROMBOPLASTIN TIME PARTIAL: CPT

## 2021-07-23 PROCEDURE — 87636 SARSCOV2 & INF A&B AMP PRB: CPT

## 2021-07-23 PROCEDURE — 96361 HYDRATE IV INFUSION ADD-ON: CPT

## 2021-07-23 PROCEDURE — 83605 ASSAY OF LACTIC ACID: CPT

## 2021-07-23 PROCEDURE — 85610 PROTHROMBIN TIME: CPT

## 2021-07-23 PROCEDURE — 82805 BLOOD GASES W/O2 SATURATION: CPT

## 2021-07-23 PROCEDURE — 36415 COLL VENOUS BLD VENIPUNCTURE: CPT

## 2021-07-23 PROCEDURE — 94640 AIRWAY INHALATION TREATMENT: CPT

## 2021-07-23 PROCEDURE — 80048 BASIC METABOLIC PNL TOTAL CA: CPT

## 2021-07-23 PROCEDURE — 84145 PROCALCITONIN (PCT): CPT

## 2021-07-23 PROCEDURE — 80069 RENAL FUNCTION PANEL: CPT

## 2021-07-23 RX ADMIN — DEXTROSE MONOHYDRATE, SODIUM CHLORIDE, AND POTASSIUM CHLORIDE SCH MLS/HR: 50; 4.5; 1.49 INJECTION, SOLUTION INTRAVENOUS at 23:38

## 2021-07-23 RX ADMIN — SODIUM CHLORIDE SCH MLS/HR: 900 INJECTION, SOLUTION INTRAVENOUS at 23:47

## 2021-07-23 NOTE — DIAGNOSTIC IMAGING REPORT
EXAMINATION: Chest 1 view



HISTORY: sepsis



COMPARISON: 09/26/2018



FINDINGS: 



Heart size and pulmonary vasculature are normal. Mild

interstitial opacities in the lung bases. No pleural effusion or

pneumothorax. The osseous structures are intact.



IMPRESSION: 



1. Mild interstitial opacities in the lung bases which could

represent atelectasis, pulmonary edema, or atypical infection.



Dictated by: 



  Dictated on workstation # [id number]

## 2021-07-23 NOTE — ED RESPIRATORY
General


Chief Complaint:  Respiratory Problems


Stated Complaint:  COUGH


Nursing Triage Note:  


PT BROUGHT BY [facility] ON BLS. PT HAS BEEN HAVING SHORTNESS OF BREATH




X'S 4 DAYS. PT WAS EXPOSED TO FRIENDS WHO WERE POSITIVE ABOUT A WEEK AGO.


Source:  family


Exam Limitations:  no limitations


 (CHELSEA BUCKLEY)





History of Present Illness


Date Seen by Provider:  2021


Time Seen by Provider:  17:26


Initial Comments


Patient to the ER by EMS from home with chief complaint of shortness of air 

nausea weakness whenever she stands up.  She is been symptomatic since , 5

days ago.  She thinks she was exposed to Friday 7 days ago because 3 of the 

friends she hangs out with became positive with COVID-19.  Patient had some 

nausea when she gets to coughing.  She is had some chest pain with deep 

inspiration or coughing.  She has no history of heart disease or kidney disease.

 No swelling in her hands or feet.  She had an episode of diarrhea.  She feels 

dehydrated.


 (CHELSEA BUCKLEY)





Allergies and Home Medications


Allergies


Coded Allergies:  


     Sulfa (Sulfonamide Antibiotics) (Unverified  Allergy, Unknown, 18)





Home Medications


Atorvastatin Calcium 10 Mg Tablet, 10 MG PO HS, (Reported)


Metoprolol Succinate 50 Mg Tab.er.24h, 50 MG PO HS, (Reported)


Valacyclovir HCl 500 Mg Tablet, 500 MG PO HS, (Reported)





Patient Home Medication List


Home Medication List Reviewed:  Yes


 (CHELSEA BUCKLEY)





Review of Systems


Review of Systems


Constitutional:  No chills, No diaphoresis


EENTM:  No ear discharge, No hearing loss, No ear pain


Respiratory:  cough, dyspnea on exertion; No phlegm; short of breath


Cardiovascular:  see HPI, chest pain; No edema, No Hx of Intervention, No 

palpitations, No syncope


Gastrointestinal:  No abdominal pain, No nausea, No vomiting


Genitourinary:  No discharge, No dysuria


Musculoskeletal:  No back pain, No joint pain


Skin:  No pruritus, No rash (CHELSEA BUCKLEY)





All Other Systems Reviewed


Negative Unless Noted:  Yes


 (CHELSEA BUCKLEY)





Past Medical-Social-Family Hx


Patient Social History


Tobacco Use?:  Yes


Smoking Status:  Current Everyday Smoker


E-Cig or Vaping type used:  Nicotine


Substance use?:  No


Alcohol Use?:  Yes


Alcohol type:  Hard Liquor


Alcohol Frequency:  Daily


Pt feels they are or have been:  No


 (CHELSEA BUCKLEY)





Physical Exam





Vital Signs - First Documented























 (LEXI DAVISON DO)


Capillary Refill :  


 (CHELSEA BUCKLEY)


Height: 5'8.00"


Weight: 136lbs. 0.0oz. 61.275491cu; 22.00 BMI


Method:


General Appearance:  WD/WN, mild distress


Eyes:  Bilateral Eye Normal Inspection, Bilateral Eye PERRL, Bilateral Eye EOMI


HEENT:  PERRL/EOMI, normal ENT inspection, TMs normal (Clear fluid middle ear 

bilateral), pharynx normal


Neck:  full range of motion, normal inspection


Respiratory:  lungs clear, normal breath sounds, no accessory muscle use, 

respiratory distress (Mild with oxygen saturations 91 to 92% on room air while 

at rest.)


Cardiovascular:  normal peripheral pulses, regular rate, rhythm, no edema


Gastrointestinal:  normal bowel sounds, non tender, soft


Extremities:  non-tender, normal capillary refill


Neurologic/Psychiatric:  CNs II-XII nml as tested, no motor/sensory deficits, 

alert, normal mood/affect, oriented x 3


Skin:  normal color, warm/dry (CHELSEA BUCKLEY)





Focused Exam


Lactate Level


21 17:35: Lactic Acid Level 1.61


 (LEXI DAVISON DO)


Lactic Acid Level





Laboratory Tests








Test


 21


17:35


 


Lactic Acid Level


 1.61 MMOL/L


(0.50-2.00)





 (LEXI DAVISON DO)





Progress/Results/Core Measures


Suspected Sepsis


SIRS


Temperature: 


Pulse: 102 


Respiratory Rate: 20


 


Laboratory Tests


21 17:35: White Blood Count 5.5


Blood Pressure 126 /79 


Mean: 95


 


21 17:35: Lactic Acid Level 1.61


Laboratory Tests


21 17:35: 


Creatinine 0.75, INR Comment 0.9, Platelet Count 196, Total Bilirubin 0.5


 (CHELSEA BUCKLEY)





Results/Orders


Lab Results





Laboratory Tests








Test


 21


17:20 21


17:35 21


17:44 Range/Units


 


 


Influenza Type A (RT-PCR) Not Detected    Not Detecte  


 


Influenza Type B (RT-PCR) Not Detected    Not Detecte  


 


SARS-CoV-2 RNA (RT-PCR) Detected H   Not Detecte  


 


White Blood Count


 


 5.5 


 


 4.3-11.0


10^3/uL


 


Red Blood Count


 


 4.63 


 


 3.80-5.11


10^6/uL


 


Hemoglobin  13.7   11.5-16.0  g/dL


 


Hematocrit  42   35-52  %


 


Mean Corpuscular Volume  91   80-99  fL


 


Mean Corpuscular Hemoglobin  30   25-34  pg


 


Mean Corpuscular Hemoglobin


Concent 


 33 


 


 32-36  g/dL





 


Red Cell Distribution Width  12.7   10.0-14.5  %


 


Platelet Count


 


 196 


 


 130-400


10^3/uL


 


Mean Platelet Volume  9.6   9.0-12.2  fL


 


Immature Granulocyte % (Auto)  1    %


 


Neutrophils (%) (Auto)  84 H  42-75  %


 


Lymphocytes (%) (Auto)  9 L  12-44  %


 


Monocytes (%) (Auto)  7   0-12  %


 


Eosinophils (%) (Auto)  0   0-10  %


 


Basophils (%) (Auto)  0   0-10  %


 


Neutrophils # (Auto)


 


 4.6 


 


 1.8-7.8


10^3/uL


 


Lymphocytes # (Auto)


 


 0.5 L


 


 1.0-4.0


10^3/uL


 


Monocytes # (Auto)


 


 0.4 


 


 0.0-1.0


10^3/uL


 


Eosinophils # (Auto)


 


 0.0 


 


 0.0-0.3


10^3/uL


 


Basophils # (Auto)


 


 0.0 


 


 0.0-0.1


10^3/uL


 


Immature Granulocyte # (Auto)


 


 0.0 


 


 0.0-0.1


10^3/uL


 


Prothrombin Time  12.7   12.2-14.7  SEC


 


INR Comment  0.9   0.8-1.4  


 


Activated Partial


Thromboplast Time 


 40 H


 


 24-35  SEC





 


Sodium Level  139   135-145  MMOL/L


 


Potassium Level  3.9   3.6-5.0  MMOL/L


 


Chloride Level  98     MMOL/L


 


Carbon Dioxide Level  29   21-32  MMOL/L


 


Anion Gap  12   5-14  MMOL/L


 


Blood Urea Nitrogen  8   7-18  MG/DL


 


Creatinine


 


 0.75 


 


 0.60-1.30


MG/DL


 


Estimat Glomerular Filtration


Rate 


 80 


 


  





 


BUN/Creatinine Ratio  11    


 


Glucose Level  110 H    MG/DL


 


Lactic Acid Level


 


 1.61 


 


 0.50-2.00


MMOL/L


 


Calcium Level  9.6   8.5-10.1  MG/DL


 


Corrected Calcium  9.4   8.5-10.1  MG/DL


 


Total Bilirubin  0.5   0.1-1.0  MG/DL


 


Aspartate Amino Transf


(AST/SGOT) 


 27 


 


 5-34  U/L





 


Alanine Aminotransferase


(ALT/SGPT) 


 18 


 


 0-55  U/L





 


Alkaline Phosphatase  57     U/L


 


Troponin I  < 0.028   <0.028  NG/ML


 


C-Reactive Protein High


Sensitivity 


 24.17 H


 


 0.00-0.50


MG/DL


 


Total Protein  8.4 H  6.4-8.2  GM/DL


 


Albumin  4.2   3.2-4.5  GM/DL


 


Blood Gas Puncture Site   RR   


 


Blood Gas Patient Temperature   100.9   


 


Arterial Blood pH   7.49 H 7.37-7.43  


 


Arterial Blood Partial


Pressure CO2 


 


 34 L


 35-45  MMHG





 


Arterial Blood Partial


Pressure O2 


 


 57 L


 79-93  MMHG





 


Arterial Blood HCO3   26  23-27  MMOL/L


 


Arterial Blood Total CO2


 


 


 26.6 


 21.0-31.0


MMOL/L


 


Arterial Blood Oxygen


Saturation 


 


 88 L


   %





 


Arterial Blood Base Excess


 


 


 2.7 H


 -2.5-2.5


MMOL/L


 


Jermaine Test   YES-POS   


 


Blood Gas Ventilator Setting   NO   


 


Blood Gas Inspired Oxygen   RA   





 (LEXI DAVISON DO)


Medications Given in ED





Current Medications








 Medications  Dose


 Ordered  Sig/Valorie


 Route  Start Time


 Stop Time Status Last Admin


Dose Admin


 


 Ketorolac


 Tromethamine  30 mg  ONCE  ONCE


 IVP  21 18:15


 21 18:16 DC 21 18:18


30 MG





 (JEANINELEXI K DO)


Vital Signs/I&O











 21





 17:20 17:20 18:15


 


Temp 38.2 38.2 38.2


 


Pulse 102 102 


 


Resp 20 20 


 


B/P (MAP) 126/79 (95) 126/79 (95) 


 


Pulse Ox 94 94 


 


O2 Delivery Room Air Nasal Cannula 


 


O2 Flow Rate 2.00 2.00 














 21





 23:59


 


Intake Total 1000 ml


 


Balance 1000 ml








 (JEANINE,LEXI K DO)


Vital Signs/I&O


Capillary Refill :  


 (CHELSEA BUCKLEY)








Blood Pressure Mean:                    95








Progress Note #1:  


   Time:  18:18


Progress Note


Patient is on borderline O2 so we will check an ABG chest x-ray EKG Covid labs 

give her a liter of fluids.  If all she needs a little oxygen we can send her 

home.  If she does not need oxygen we can send her home and set her up for 

monoclonal antibody infusion.  Tylenol Toradol aspirin for her headache and 

fever.





ABGs on room air and definitely supports her hypoxia with her PaO2 of 57 and her

respiratory alkalosis.


Progress Note #2:  


   Time:  20:14


Progress Note


Patient is requiring some oxygen based on ABG and is more comfortable with it.  

After some fluids if she is feeling better we will let her go home on oxygen or 

she may need observed for more hydration and oxygen related to Covid.  The Covid

swab is still pending.  The nurse called the lab and they said they are working 

on it.


 (CHELSEA BUCKLEY)


Progress Note :  


Progress Note


ASSUMED CARE FROM DR. BUCKLEY AT SHIFT CHANGE. 


PT IS STILL REQUIRING O2 AT 2L/NC TO MAINTAIN O2 SATS > 94%. SATS DROP INTO 80'S

WITH AMBULATION TO BATHROOM


CONTINUES TO FEEL VERY WEAK AND DOES NOT FEEL COMFORTABLE GOING HOME


 (LEXI DAVISON DO)


ECG


Initial ECG Impression Date:  2021


Initial ECG Impression Time:  17:42


Initial ECG Rate:  102


Initial ECG Rhythm:  S.Tach


Initial ECG Intervals:  Normal


Initial ECG Impression:  Normal


Comment


Sinus tachycardia without clinically relevant ST elevation or depression.


 (CHELSEA BUCKLEY)





Diagnostic Imaging





   Diagonstic Imaging:  Xray


   Plain Films/CT/US/NM/MRI:  chest


Comments


                 [facility].


                                [place], [place]





NAME:   DEV BOLANOS


MED REC#:   [id number]


ACCOUNT#:   [id number]


PT STATUS:   REG ER


:   1965


PHYSICIAN:   CHELSEA BUCKLEY MD


ADMIT DATE:   21/ER


                                  ***Signed***


Date of Exam:21





CHEST 1 VIEW, AP/PA ONLY








EXAMINATION: Chest 1 view





HISTORY: sepsis





COMPARISON: 2018





FINDINGS: 





Heart size and pulmonary vasculature are normal. Mild


interstitial opacities in the lung bases. No pleural effusion or


pneumothorax. The osseous structures are intact.





IMPRESSION: 





1. Mild interstitial opacities in the lung bases which could


represent atelectasis, pulmonary edema, or atypical infection.





Dictated by: 





  Dictated on workstation # [id number]








Dict:   21 1821


Trans:   21 1837


[facility] 9836-2236





Interpreted by:     RANJANA DAY DO


Electronically signed by: RANJANA DAY DO 21


   Reviewed:  Reviewed by Me


 (CHELSEA BUCKLEY)





Transfer of Care


Transfer of Care Time:  20:14


Care transferred to:  Dr. Davison


 (CHELSEA BUCKLEY)





Departure


Communication (Admissions)


--ATTEMPTING TO CONTACT DR. HOLGER LANG, MESSAGE LEFT ON CELL PHONE


--SPOKE WITH DR. LANG, ACCEPTS PT FOR ADMIT. ADVISES REMDESIVIR, DECADRON

AND ZITHROMAX


 (LEXI DAVISON DO)





Impression





   Primary Impression:  


   Pneumonia due to COVID-19 virus


   Additional Impression:  


   Acute respiratory failure with hypoxia


Disposition:   ADMITTED AS INPATIENT


Condition:  Stable





Admissions


Decision to Admit Reason:  Admit from ER (General)


Decision to Admit/Date:  2021


Time/Decision to Admit Time:  20:40


 (LEXI DAVISON DO)





Departure-Patient Inst.


Referrals:  


HEMAL CEVALLOS MD (PCP/Family)


Primary Care Physician











CHELSEA BUCKLEY                 2021 18:18


LEXI DAVISON DO                 2021 05:49

## 2021-07-24 VITALS — DIASTOLIC BLOOD PRESSURE: 57 MMHG | SYSTOLIC BLOOD PRESSURE: 116 MMHG

## 2021-07-24 VITALS — DIASTOLIC BLOOD PRESSURE: 59 MMHG | SYSTOLIC BLOOD PRESSURE: 100 MMHG

## 2021-07-24 VITALS — SYSTOLIC BLOOD PRESSURE: 105 MMHG | DIASTOLIC BLOOD PRESSURE: 64 MMHG

## 2021-07-24 VITALS — SYSTOLIC BLOOD PRESSURE: 116 MMHG | DIASTOLIC BLOOD PRESSURE: 60 MMHG

## 2021-07-24 VITALS — DIASTOLIC BLOOD PRESSURE: 55 MMHG | SYSTOLIC BLOOD PRESSURE: 103 MMHG

## 2021-07-24 VITALS — DIASTOLIC BLOOD PRESSURE: 58 MMHG | SYSTOLIC BLOOD PRESSURE: 111 MMHG

## 2021-07-24 VITALS — DIASTOLIC BLOOD PRESSURE: 79 MMHG | SYSTOLIC BLOOD PRESSURE: 126 MMHG

## 2021-07-24 VITALS — SYSTOLIC BLOOD PRESSURE: 112 MMHG | DIASTOLIC BLOOD PRESSURE: 61 MMHG

## 2021-07-24 LAB
BASOPHILS # BLD AUTO: 0 10^3/UL (ref 0–0.1)
BASOPHILS NFR BLD AUTO: 0 % (ref 0–10)
BUN/CREAT SERPL: 10
CALCIUM SERPL-MCNC: 8.9 MG/DL (ref 8.5–10.1)
CHLORIDE SERPL-SCNC: 101 MMOL/L (ref 98–107)
CO2 SERPL-SCNC: 25 MMOL/L (ref 21–32)
CREAT SERPL-MCNC: 0.79 MG/DL (ref 0.6–1.3)
EOSINOPHIL # BLD AUTO: 0 10^3/UL (ref 0–0.3)
EOSINOPHIL NFR BLD AUTO: 0 % (ref 0–10)
GFR SERPLBLD BASED ON 1.73 SQ M-ARVRAT: 75 ML/MIN
GLUCOSE SERPL-MCNC: 211 MG/DL (ref 70–105)
HCT VFR BLD CALC: 37 % (ref 35–52)
HGB BLD-MCNC: 11.9 G/DL (ref 11.5–16)
LYMPHOCYTES # BLD AUTO: 0.3 10^3/UL (ref 1–4)
LYMPHOCYTES NFR BLD AUTO: 5 % (ref 12–44)
MANUAL DIFFERENTIAL PERFORMED BLD QL: NO
MCH RBC QN AUTO: 30 PG (ref 25–34)
MCHC RBC AUTO-ENTMCNC: 32 G/DL (ref 32–36)
MCV RBC AUTO: 91 FL (ref 80–99)
MONOCYTES # BLD AUTO: 0.1 10^3/UL (ref 0–1)
MONOCYTES NFR BLD AUTO: 2 % (ref 0–12)
NEUTROPHILS # BLD AUTO: 5 10^3/UL (ref 1.8–7.8)
NEUTROPHILS NFR BLD AUTO: 93 % (ref 42–75)
PLATELET # BLD: 184 10^3/UL (ref 130–400)
PMV BLD AUTO: 9.6 FL (ref 9–12.2)
POTASSIUM SERPL-SCNC: 3.7 MMOL/L (ref 3.6–5)
SODIUM SERPL-SCNC: 138 MMOL/L (ref 135–145)
WBC # BLD AUTO: 5.4 10^3/UL (ref 4.3–11)

## 2021-07-24 RX ADMIN — DEXTROSE MONOHYDRATE, SODIUM CHLORIDE, AND POTASSIUM CHLORIDE SCH MLS/HR: 50; 4.5; 1.49 INJECTION, SOLUTION INTRAVENOUS at 10:19

## 2021-07-24 RX ADMIN — BENZONATATE PRN MG: 100 CAPSULE ORAL at 09:06

## 2021-07-24 RX ADMIN — ALBUTEROL SULFATE SCH GM: 90 AEROSOL, METERED RESPIRATORY (INHALATION) at 20:40

## 2021-07-24 RX ADMIN — DEXTROSE MONOHYDRATE, SODIUM CHLORIDE, AND POTASSIUM CHLORIDE SCH MLS/HR: 50; 4.5; 1.49 INJECTION, SOLUTION INTRAVENOUS at 20:42

## 2021-07-24 RX ADMIN — ALBUTEROL SULFATE SCH GM: 90 AEROSOL, METERED RESPIRATORY (INHALATION) at 02:09

## 2021-07-24 RX ADMIN — SODIUM CHLORIDE SCH MLS/HR: 900 INJECTION, SOLUTION INTRAVENOUS at 20:36

## 2021-07-24 RX ADMIN — ENOXAPARIN SODIUM SCH MG: 100 INJECTION SUBCUTANEOUS at 08:20

## 2021-07-24 RX ADMIN — BENZONATATE PRN MG: 100 CAPSULE ORAL at 16:01

## 2021-07-24 RX ADMIN — ACETAMINOPHEN PRN MG: 500 TABLET ORAL at 17:44

## 2021-07-24 RX ADMIN — ALBUTEROL SULFATE SCH GM: 90 AEROSOL, METERED RESPIRATORY (INHALATION) at 08:28

## 2021-07-24 RX ADMIN — DEXTROSE MONOHYDRATE, SODIUM CHLORIDE, AND POTASSIUM CHLORIDE SCH MLS/HR: 50; 4.5; 1.49 INJECTION, SOLUTION INTRAVENOUS at 06:17

## 2021-07-24 RX ADMIN — BENZONATATE PRN MG: 100 CAPSULE ORAL at 00:23

## 2021-07-24 RX ADMIN — ALBUTEROL SULFATE SCH GM: 90 AEROSOL, METERED RESPIRATORY (INHALATION) at 15:31

## 2021-07-24 RX ADMIN — ONDANSETRON PRN MG: 2 INJECTION, SOLUTION INTRAMUSCULAR; INTRAVENOUS at 20:46

## 2021-07-24 RX ADMIN — ONDANSETRON PRN MG: 2 INJECTION, SOLUTION INTRAMUSCULAR; INTRAVENOUS at 00:22

## 2021-07-24 NOTE — HISTORY & PHYSICAL
History of Present Illness


History of Present Illness


Reason for visit/HPI


54 yo F admitted for acute resp failure due to COVID 19.  She reports 2 weeks 

ago thought she had a sinus infection-  PCP saw her via telemedicine visit and 

Rx azithromycin and steroids.  That helped a little bit but the last 6 days she 

has felt really weak and laid in bed nearly all of 6 days.   is ill too 

but not quite as bad- he called EMS yesterday and she was brought to the ER.  


She is requiring oxygen to keep her oxygen saturation up-  2L at rest but 

requirements go up if she moves around.


This AM-  patient reports she feels about 100% better.





She is not vaccinated for COVID.





Vapes some.  Denies cigarettes.  Etoh nightly.


Date of Admission


Jul 23, 2021 at 20:40


Date Seen by a Provider:  Jul 24, 2021


Time Seen by a Provider:  14:00


I consulted on this patient on


7/24/21


 16:51


Attending Physician


Rosario Corea MD


Admitting Physician


Rosario Corea MD


Consult








Allergies and Home Medications


Allergies


Coded Allergies:  


     Sulfa (Sulfonamide Antibiotics) (Unverified  Allergy, Unknown, 9/12/18)





Home Medications


Atorvastatin Calcium 10 Mg Tablet, 10 MG PO HS, (Reported)


   Last Action: Reviewed


Buspirone HCl 5 Mg Tablet, 5 MG PO DAILY, (Reported)


   Last Action: Reviewed


Metoprolol Succinate 50 Mg Tab.er.24h, 50 MG PO HS, (Reported)


   Last Action: Reviewed


Valacyclovir HCl 500 Mg Tablet, 500 MG PO HS, (Reported)


   Last Action: Reviewed





Patient Home Medication List


Home Medication List Reviewed:  Yes





Past Medical-Social-Family Hx


Patient Social History


Marrital Status:  


Tobacco Use?:  No


Smoking Status:  Never a Smoker


Smokeless Tobacco Frequency:  Never a User


Use of E-Cig and/or Vaping dev:  Yes


E-Cig or Vaping type used:  Nicotine


Use of E-Cig and/or Vaping [place]:  Light User


Substance use?:  No


Alcohol Use?:  Yes


Alcohol type:  Hard Liquor


Additional alcohol type:  CROWN AND WATER


Alcohol Frequency:  Once in a while


Pt feels they are or have been:  No





Immunizations Up To Date


Tetanus Booster (TDap):  Less Than 5 Years


Hepatitis A:  No


Hepatitis B:  No





Current Status


Pregnancy status:  No


Breastfeeding status:  No


Advance Directives:  No


Communicates:  Verbally


Primary Language:  English


Preferred Spoken Language:  English


Is interpretation needed?:  No


Implanted or Applied Medical D:  None





Review of Systems


Review of Systems


General:  No Chills, No Night Sweats; Other (fever)


HEENT:  Head Aches


Pulmonary:  Dyspnea, Cough


Cardiovascular:  Chest Pain


Gastrointestinal:  No: Nausea, Vomiting, Abdominal Pain


Genitourinary:  No Dysuria


Musculoskeletal:  No: neck pain


Neurological:  Weakness





All Other Systems Reviewed


All Other Systems Reviewed:  Yes





Physical Exam


Vital Signs





Vital Signs - First Documented








 7/25/21





 10:05


 


FiO2 100














Vital Signs








  Date Time  Temp Pulse Resp B/P (MAP) Pulse Ox O2 Delivery O2 Flow Rate FiO2


 


7/25/21 12:53  77      


 


7/25/21 12:00 36.8 80 18 134/68 (90) 98 Vapotherm 30.00 





       80.00 


 


7/25/21 10:05     99 Vapotherm 40.00 100


 


7/25/21 08:03     90 Nasal Cannula 5.00 


 


7/25/21 08:00 37.0 74 20 119/57 (77) 91 High Flow N/C 5.00 


 


7/25/21 08:00     91 High Flow N/C 5.00 


 


7/25/21 07:00  65      


 


7/25/21 03:48 36.6 73 18 110/58 (75) 97 High Flow N/C 6.00 


 


7/25/21 02:00     91 Nasal Cannula 6.00 


 


7/25/21 00:59  69      


 


7/24/21 23:32 36.7 65 18 116/60 (78) 97 High Flow N/C 6.00 


 


7/24/21 20:00     94 Nasal Cannula 2.00 


 


7/24/21 19:08 37.1 80 20 100/59 (73) 97 Nasal Cannula 2.00 


 


7/24/21 19:00  83      


 


7/24/21 15:35     94 Nasal Cannula 1.00 


 


7/24/21 15:31     97 Nasal Cannula 2.00 


 


7/24/21 15:14 36.8 78 20 111/58 (75) 99 Nasal Cannula 2.00 














I & O 


 


 7/25/21





 07:00


 


Intake Total 2100 ml


 


Balance 2100 ml








Height, Weight, BMI


Height: 5'8.00"


Weight: 136lbs. 0.0oz. 61.548196oa; 21.99 BMI


Method:


General Appearance:  Mild Distress


HEENT:  PERRL/EOMI


Neck:  Non Tender, Supple


Respiratory:  Chest Non Tender, No Accessory Muscle Use, Respiratory Distress 

(with exertion), Rhonci


Cardiovascular:  Regular Rate, Rhythm


Gastrointestinal:  Non Tender, Soft


Rectal:  Deferred


Back:  Normal Inspection, No CVA Tenderness


Extremity:  Non Tender, No Calf Tenderness


Neurologic/Psychiatric:  Alert, Oriented x3, No Motor/Sensory Deficits, Normal 

Mood/Affect


Skin:  Warm/Dry





Assessment/Plan


Assessment/Plan


Admission Dx


acute hypoxic respiratory failure due to COVID


Admission Status:  Inpatient Order (span 2 midnights)


Reason for Inpatient Admission:  


She will require 2 midnights due to acute hypoxic respiratory failure-


she could decompensate quickly as she was worsening on admission.


Assessment and Plan


54 yo F with COVID


-continue dexamethasone, remdesivir, RT, albuterol


-procalcitonin- does not indicate bacterial infection. 


-doing well on 2L oxygen


-start metoprolol 25mg BID


-turned IVF down to 75ml/hr.  May d/c ivf tomorrow.





Dispo:  continue to monitor -   


lovenox for dvt ppx


Problems:  


(1) Acute respiratory failure with hypoxia


(2) Pneumonia due to COVID-19 virus


(3) HTN (hypertension)


(4) HLD (hyperlipidemia)











GIULIANA LANG MD              Jul 24, 2021 16:58

## 2021-07-25 VITALS — SYSTOLIC BLOOD PRESSURE: 134 MMHG | DIASTOLIC BLOOD PRESSURE: 68 MMHG

## 2021-07-25 VITALS — DIASTOLIC BLOOD PRESSURE: 67 MMHG | SYSTOLIC BLOOD PRESSURE: 125 MMHG

## 2021-07-25 VITALS — DIASTOLIC BLOOD PRESSURE: 58 MMHG | SYSTOLIC BLOOD PRESSURE: 110 MMHG

## 2021-07-25 VITALS — SYSTOLIC BLOOD PRESSURE: 129 MMHG | DIASTOLIC BLOOD PRESSURE: 77 MMHG

## 2021-07-25 VITALS — DIASTOLIC BLOOD PRESSURE: 55 MMHG | SYSTOLIC BLOOD PRESSURE: 109 MMHG

## 2021-07-25 VITALS — DIASTOLIC BLOOD PRESSURE: 57 MMHG | SYSTOLIC BLOOD PRESSURE: 119 MMHG

## 2021-07-25 LAB
ALBUMIN SERPL-MCNC: 4 GM/DL (ref 3.2–4.5)
BASOPHILS # BLD AUTO: 0 10^3/UL (ref 0–0.1)
BASOPHILS NFR BLD AUTO: 0 % (ref 0–10)
BUN/CREAT SERPL: 9
CALCIUM SERPL-MCNC: 9.4 MG/DL (ref 8.5–10.1)
CHLORIDE SERPL-SCNC: 103 MMOL/L (ref 98–107)
CO2 SERPL-SCNC: 25 MMOL/L (ref 21–32)
CREAT SERPL-MCNC: 0.82 MG/DL (ref 0.6–1.3)
EOSINOPHIL # BLD AUTO: 0 10^3/UL (ref 0–0.3)
EOSINOPHIL NFR BLD AUTO: 0 % (ref 0–10)
GFR SERPLBLD BASED ON 1.73 SQ M-ARVRAT: 72 ML/MIN
GLUCOSE SERPL-MCNC: 133 MG/DL (ref 70–105)
HCT VFR BLD CALC: 39 % (ref 35–52)
HGB BLD-MCNC: 12.5 G/DL (ref 11.5–16)
LYMPHOCYTES # BLD AUTO: 0.4 10^3/UL (ref 1–4)
LYMPHOCYTES NFR BLD AUTO: 3 % (ref 12–44)
MANUAL DIFFERENTIAL PERFORMED BLD QL: YES
MCH RBC QN AUTO: 29 PG (ref 25–34)
MCHC RBC AUTO-ENTMCNC: 32 G/DL (ref 32–36)
MCV RBC AUTO: 91 FL (ref 80–99)
MONOCYTES # BLD AUTO: 0.5 10^3/UL (ref 0–1)
MONOCYTES NFR BLD AUTO: 4 % (ref 0–12)
MONOCYTES NFR BLD: 4 %
NEUTROPHILS # BLD AUTO: 12.6 10^3/UL (ref 1.8–7.8)
NEUTROPHILS NFR BLD AUTO: 92 % (ref 42–75)
NEUTS BAND NFR BLD MANUAL: 94 %
PHOSPHATE SERPL-MCNC: 3.2 MG/DL (ref 2.3–4.7)
PLATELET # BLD: 254 10^3/UL (ref 130–400)
PMV BLD AUTO: 9.8 FL (ref 9–12.2)
POTASSIUM SERPL-SCNC: 3.8 MMOL/L (ref 3.6–5)
RBC MORPH BLD: NORMAL
SODIUM SERPL-SCNC: 144 MMOL/L (ref 135–145)
VARIANT LYMPHS NFR BLD MANUAL: 2 %
WBC # BLD AUTO: 13.8 10^3/UL (ref 4.3–11)

## 2021-07-25 RX ADMIN — SODIUM CHLORIDE SCH MLS/HR: 900 INJECTION, SOLUTION INTRAVENOUS at 08:16

## 2021-07-25 RX ADMIN — ALBUTEROL SULFATE SCH GM: 90 AEROSOL, METERED RESPIRATORY (INHALATION) at 21:14

## 2021-07-25 RX ADMIN — DOCUSATE SODIUM SCH MG: 100 CAPSULE ORAL at 21:41

## 2021-07-25 RX ADMIN — GUAIFENESIN SCH MG: 600 TABLET, EXTENDED RELEASE ORAL at 21:41

## 2021-07-25 RX ADMIN — ENOXAPARIN SODIUM SCH MG: 100 INJECTION SUBCUTANEOUS at 08:16

## 2021-07-25 RX ADMIN — SODIUM CHLORIDE SCH MLS/HR: 900 INJECTION, SOLUTION INTRAVENOUS at 21:42

## 2021-07-25 RX ADMIN — ALBUTEROL SULFATE PRN GM: 90 AEROSOL, METERED RESPIRATORY (INHALATION) at 21:08

## 2021-07-25 RX ADMIN — BENZONATATE PRN MG: 100 CAPSULE ORAL at 06:07

## 2021-07-25 RX ADMIN — ALBUTEROL SULFATE SCH GM: 90 AEROSOL, METERED RESPIRATORY (INHALATION) at 08:03

## 2021-07-25 RX ADMIN — ACETAMINOPHEN PRN MG: 500 TABLET ORAL at 13:28

## 2021-07-25 RX ADMIN — ACETAMINOPHEN PRN MG: 500 TABLET ORAL at 23:58

## 2021-07-25 RX ADMIN — ALBUTEROL SULFATE SCH GM: 90 AEROSOL, METERED RESPIRATORY (INHALATION) at 02:00

## 2021-07-25 RX ADMIN — ALBUTEROL SULFATE PRN GM: 90 AEROSOL, METERED RESPIRATORY (INHALATION) at 01:59

## 2021-07-25 RX ADMIN — POLYETHYLENE GLYCOL (3350) SCH GM: 17 POWDER, FOR SOLUTION ORAL at 21:42

## 2021-07-25 RX ADMIN — DOCUSATE SODIUM SCH MG: 100 CAPSULE ORAL at 10:42

## 2021-07-25 RX ADMIN — GUAIFENESIN SCH MG: 600 TABLET, EXTENDED RELEASE ORAL at 10:42

## 2021-07-25 NOTE — PROGRESS NOTE
Subjective


Subjective


Date Seen by Provider:  Jul 25, 2021


Time Seen by Provider:  11:10


Oxygen saturations down in low 90s this AM-  switched to vapotherm.


Patient feels more tired-  tired after getting up to go to the restroom.   She 

is hoping to go home tomorrow-  I informed her this is not likely because she is

requiring vapotherm.  


She is mouth breathing and when I remind her to breath through her nose- her 

oxygen sats come back up.


Nursing notes this afternoon oxygen sats went into 70s with ambulation to 

urinate.





Review of Systems


General:  No Chills, No Night Sweats; Other (fever)


HEENT:  Head Aches


Pulmonary:  Dyspnea, Cough


Cardiovascular:  Chest Pain


Gastrointestinal:  No: Nausea, Vomiting, Abdominal Pain


Genitourinary:  No Dysuria


Musculoskeletal:  No: neck pain


Neurological:  Weakness





All Other Systems Reviewed


All Other Systems Reviewed:  Yes





Objective


Exam


Vital Signs





Vital Signs








  Date Time  Temp Pulse Resp B/P (MAP) Pulse Ox O2 Delivery O2 Flow Rate FiO2


 


7/25/21 08:03     90 Nasal Cannula 5.00 


 


7/25/21 08:00     91 High Flow N/C 5.00 


 


7/25/21 07:00  65      


 


7/25/21 03:48 36.6 73 18 110/58 (75) 97 High Flow N/C 6.00 


 


7/25/21 02:00     91 Nasal Cannula 6.00 


 


7/25/21 00:59  69      


 


7/24/21 23:32 36.7 65 18 116/60 (78) 97 High Flow N/C 6.00 


 


7/24/21 20:00     94 Nasal Cannula 2.00 


 


7/24/21 19:08 37.1 80 20 100/59 (73) 97 Nasal Cannula 2.00 


 


7/24/21 19:00  83      


 


7/24/21 15:35     94 Nasal Cannula 1.00 


 


7/24/21 15:31     97 Nasal Cannula 2.00 


 


7/24/21 15:14 36.8 78 20 111/58 (75) 99 Nasal Cannula 2.00 


 


7/24/21 12:56 36.8 86 20 103/55 (71) 97 Nasal Cannula 2.00 


 


7/24/21 12:55  79      


 


7/24/21 12:15  78 20 112/61 (78) 94 Nasal Cannula 3.00 


 


7/24/21 11:49      Nasal Cannula 2.00 


 


7/24/21 11:40     96 Nasal Cannula 3.00 














I & O 


 


 7/25/21





 06:59


 


Intake Total 2100 ml


 


Balance 2100 ml








General Appearance:  Mild Distress


Eyes:  Bilateral Eye Normal Inspection, Bilateral Eye PERRL, Bilateral Eye EOMI


HEENT:  PERRL/EOMI


Neck:  Non Tender, Supple


Respiratory:  Chest Non Tender, No Accessory Muscle Use, Respiratory Distress, 

Rhonci


Cardiovascular:  Regular Rate, Rhythm


Gastrointestinal:  Non Tender, Soft


Rectal:  Deferred


Back:  Normal Inspection, No CVA Tenderness


Extremity:  Non Tender, No Calf Tenderness


Neurologic/Psychiatric:  Alert, Oriented x3, No Motor/Sensory Deficits, Normal 

Mood/Affect


Skin:  Warm/Dry





Results


Lab





Microbiology


7/23/21 Blood Culture - Preliminary, Resulted


          No growth





Assessment/Plan


Assessment/Plan


Admission Dx


acute hypoxic respiratory failure due to COVID


Assessment and Plan


56 yo F with COVID


7/24/21 -continue dexamethasone, remdesivir, RT, albuterol


-procalcitonin- does not indicate bacterial infection. 


-doing well on 2L oxygen


-start metoprolol 25mg BID


-turned IVF down to 75ml/hr.  May d/c ivf tomorrow.





7/25/21-  increased oxygen needs to vapotherm;  stopped IVF.  will give 40mg las

ix iv x1.    Pt is more anxious-  added xanax.  May put a michelle in if she keeps 

desating when walking to the restroom.  





Dispo:  continue to monitor -   


lovenox for dvt ppx


Problems:  


(1) Acute respiratory failure with hypoxia


(2) Pneumonia due to COVID-19 virus


Assessment & Plan:  unvaccinated


vapes


nightly etoh use





(3) HTN (hypertension)


(4) HLD (hyperlipidemia)


Admission Dx


acute hypoxic respiratory failure due to COVID





Clinical Quality Measures


Admission Status


Admission Dx


acute hypoxic respiratory failure due to COVID











GIULIANA LANG MD              Jul 25, 2021 10:02

## 2021-07-26 VITALS — SYSTOLIC BLOOD PRESSURE: 126 MMHG | DIASTOLIC BLOOD PRESSURE: 73 MMHG

## 2021-07-26 VITALS — DIASTOLIC BLOOD PRESSURE: 55 MMHG | SYSTOLIC BLOOD PRESSURE: 113 MMHG

## 2021-07-26 VITALS — DIASTOLIC BLOOD PRESSURE: 60 MMHG | SYSTOLIC BLOOD PRESSURE: 113 MMHG

## 2021-07-26 VITALS — SYSTOLIC BLOOD PRESSURE: 114 MMHG | DIASTOLIC BLOOD PRESSURE: 72 MMHG

## 2021-07-26 VITALS — SYSTOLIC BLOOD PRESSURE: 124 MMHG | DIASTOLIC BLOOD PRESSURE: 58 MMHG

## 2021-07-26 VITALS — DIASTOLIC BLOOD PRESSURE: 65 MMHG | SYSTOLIC BLOOD PRESSURE: 116 MMHG

## 2021-07-26 LAB
BASOPHILS # BLD AUTO: 0 10^3/UL (ref 0–0.1)
BASOPHILS NFR BLD AUTO: 0 % (ref 0–10)
EOSINOPHIL # BLD AUTO: 0 10^3/UL (ref 0–0.3)
EOSINOPHIL NFR BLD AUTO: 0 % (ref 0–10)
HCT VFR BLD CALC: 36 % (ref 35–52)
HGB BLD-MCNC: 11.5 G/DL (ref 11.5–16)
LYMPHOCYTES # BLD AUTO: 0.4 10^3/UL (ref 1–4)
LYMPHOCYTES NFR BLD AUTO: 4 % (ref 12–44)
MANUAL DIFFERENTIAL PERFORMED BLD QL: NO
MCH RBC QN AUTO: 29 PG (ref 25–34)
MCHC RBC AUTO-ENTMCNC: 32 G/DL (ref 32–36)
MCV RBC AUTO: 91 FL (ref 80–99)
MONOCYTES # BLD AUTO: 0.3 10^3/UL (ref 0–1)
MONOCYTES NFR BLD AUTO: 3 % (ref 0–12)
NEUTROPHILS # BLD AUTO: 9.5 10^3/UL (ref 1.8–7.8)
NEUTROPHILS NFR BLD AUTO: 90 % (ref 42–75)
PLATELET # BLD: 264 10^3/UL (ref 130–400)
PMV BLD AUTO: 9.7 FL (ref 9–12.2)
WBC # BLD AUTO: 10.5 10^3/UL (ref 4.3–11)

## 2021-07-26 RX ADMIN — DOCUSATE SODIUM SCH MG: 100 CAPSULE ORAL at 08:11

## 2021-07-26 RX ADMIN — ACETAMINOPHEN PRN MG: 500 TABLET ORAL at 15:20

## 2021-07-26 RX ADMIN — GUAIFENESIN SCH MG: 600 TABLET, EXTENDED RELEASE ORAL at 08:11

## 2021-07-26 RX ADMIN — GUAIFENESIN SCH MG: 600 TABLET, EXTENDED RELEASE ORAL at 22:08

## 2021-07-26 RX ADMIN — ENOXAPARIN SODIUM SCH MG: 100 INJECTION SUBCUTANEOUS at 08:11

## 2021-07-26 RX ADMIN — SODIUM CHLORIDE SCH MLS/HR: 900 INJECTION, SOLUTION INTRAVENOUS at 22:07

## 2021-07-26 RX ADMIN — POLYETHYLENE GLYCOL (3350) SCH GM: 17 POWDER, FOR SOLUTION ORAL at 23:26

## 2021-07-26 RX ADMIN — ONDANSETRON PRN MG: 2 INJECTION, SOLUTION INTRAMUSCULAR; INTRAVENOUS at 22:08

## 2021-07-26 RX ADMIN — ALBUTEROL SULFATE SCH GM: 90 AEROSOL, METERED RESPIRATORY (INHALATION) at 02:27

## 2021-07-26 RX ADMIN — ALBUTEROL SULFATE SCH PUFF: 90 AEROSOL, METERED RESPIRATORY (INHALATION) at 23:33

## 2021-07-26 RX ADMIN — ONDANSETRON PRN MG: 2 INJECTION, SOLUTION INTRAMUSCULAR; INTRAVENOUS at 15:21

## 2021-07-26 RX ADMIN — ACETAMINOPHEN PRN MG: 500 TABLET ORAL at 23:28

## 2021-07-26 RX ADMIN — SODIUM CHLORIDE SCH MLS/HR: 900 INJECTION, SOLUTION INTRAVENOUS at 10:10

## 2021-07-26 RX ADMIN — ALBUTEROL SULFATE SCH PUFF: 90 AEROSOL, METERED RESPIRATORY (INHALATION) at 07:31

## 2021-07-26 RX ADMIN — ALBUTEROL SULFATE SCH PUFF: 90 AEROSOL, METERED RESPIRATORY (INHALATION) at 14:50

## 2021-07-26 RX ADMIN — DOCUSATE SODIUM SCH MG: 100 CAPSULE ORAL at 23:26

## 2021-07-26 NOTE — PROGRESS NOTE
Subjective


Subjective


Date Seen by Provider:  Jul 26, 2021


Time Seen by Provider:  09:00


PT IS A 54 Y/O FEMALE WHO HAS DX OF COVID-19 AND WAS ADMITTED FOR RESPIRATORY 

FAILURE ATTRIBUTED TO THE INFECTION.


PT REPORTS THAT SHE IS SHORT OF BREATH WITH ANY ACTIVITY.  SHE DENIES ABDOMINAL 

PAIN, NAUSEA DIZZINESS, EXCESSIVE SPUTUM PRODUCTION.





Review of Systems


General:  No Chills, No Night Sweats; Fatigue, Malaise


HEENT:  Head Aches


Pulmonary:  Dyspnea, Cough


Cardiovascular:  Chest Pain


Gastrointestinal:  No: Nausea, Vomiting, Abdominal Pain


Genitourinary:  No Dysuria


Musculoskeletal:  No: neck pain


Neurological:  Weakness; No: Confusion





All Other Systems Reviewed


All Other Systems Reviewed:  Yes





Objective


Exam


Vital Signs





Vital Signs








  Date Time  Temp Pulse Resp B/P (MAP) Pulse Ox O2 Delivery O2 Flow Rate FiO2


 


7/26/21 08:13  77 18 113/60 (77) 90 High Flow N/C 5.00 


 


7/26/21 07:31     98 Vapotherm 20.00 45


 


7/26/21 07:00  68      


 


7/26/21 03:31 36.4 74 18 114/72 (86) 96 Vapotherm 20.00 





       40.00 


 


7/26/21 02:27     92 Vapotherm 40.00 50


 


7/26/21 01:00  79      


 


7/25/21 23:50 37.3 77 18 129/77 (94) 99 Vapotherm 25.00 





       55.00 


 


7/25/21 21:08     98 Vapotherm 40.00 50


 


7/25/21 20:01 36.4 78 18 125/67 (86) 98 Vapotherm 25.00 





       50.00 


 


7/25/21 20:00     9 Vapotherm 25.00 50


 


7/25/21 19:00  72      


 


7/25/21 15:50 36.4 86 20 109/55 (73) 99 Vapotherm 25.00 





       50.00 


 


7/25/21 12:53  77      


 


7/25/21 12:00 36.8 80 18 134/68 (90) 98 Vapotherm 30.00 





       80.00 


 


7/25/21 10:05     99 Vapotherm 40.00 100














I & O 


 


 7/26/21





 07:00


 


Intake Total 2400 ml


 


Output Total 1700 ml


 


Balance 700 ml








General Appearance:  No Apparent Distress, WD/WN


Eyes:  Bilateral Eye Normal Inspection, Bilateral Eye PERRL, Bilateral Eye EOMI


HEENT:  PERRL/EOMI


Neck:  Non Tender, Supple


Respiratory:  Chest Non Tender, No Accessory Muscle Use, No Respiratory 

Distress, Decreased Breath Sounds, Rhonci


Cardiovascular:  Regular Rate, Rhythm


Gastrointestinal:  Non Tender, Soft


Rectal:  Deferred


Back:  Normal Inspection, No CVA Tenderness


Extremity:  Non Tender, No Calf Tenderness


Neurologic/Psychiatric:  Alert, Oriented x3, No Motor/Sensory Deficits, Normal 

Mood/Affect


Skin:  Warm/Dry





Results


Lab


Laboratory Tests


7/25/21 10:40: 


White Blood Count 13.8H, Red Blood Count 4.25, Hemoglobin 12.5, Hematocrit 39, 

Mean Corpuscular Volume 91, Mean Corpuscular Hemoglobin 29, Mean Corpuscular 

Hemoglobin Concent 32, Red Cell Distribution Width 12.7, Platelet Count 254, 

Mean Platelet Volume 9.8, Immature Granulocyte % (Auto) 2, Neutrophils (%) 

(Auto) 92H, Lymphocytes (%) (Auto) 3L, Monocytes (%) (Auto) 4, Eosinophils (%) 

(Auto) 0, Basophils (%) (Auto) 0, Neutrophils # (Auto) 12.6H, Lymphocytes # 

(Auto) 0.4L, Monocytes # (Auto) 0.5, Eosinophils # (Auto) 0.0, Basophils # 

(Auto) 0.0, Immature Granulocyte # (Auto) 0.2H, Neutrophils % (Manual) 94, 

Lymphocytes % (Manual) 2, Monocytes % (Manual) 4, Blood Morphology Comment 

NORMAL, Sodium Level 144, Potassium Level 3.8, Chloride Level 103, Carbon 

Dioxide Level 25, Anion Gap 16H, Blood Urea Nitrogen 7, Creatinine 0.82, Estimat

Glomerular Filtration Rate 72, BUN/Creatinine Ratio 9, Glucose Level 133H, 

Calcium Level 9.4, Phosphorus Level 3.2, Albumin 4.0


7/25/21 20:37: Glucometer 101


7/26/21 06:13: 


White Blood Count 10.5, Red Blood Count 3.93, Hemoglobin 11.5, Hematocrit 36, 

Mean Corpuscular Volume 91, Mean Corpuscular Hemoglobin 29, Mean Corpuscular 

Hemoglobin Concent 32, Red Cell Distribution Width 12.5, Platelet Count 264, 

Mean Platelet Volume 9.7, Immature Granulocyte % (Auto) 2, Neutrophils (%) 

(Auto) 90H, Lymphocytes (%) (Auto) 4L, Monocytes (%) (Auto) 3, Eosinophils (%) 

(Auto) 0, Basophils (%) (Auto) 0, Neutrophils # (Auto) 9.5H, Lymphocytes # 

(Auto) 0.4L, Monocytes # (Auto) 0.3, Eosinophils # (Auto) 0.0, Basophils # 

(Auto) 0.0, Immature Granulocyte # (Auto) 0.3H





Microbiology


7/23/21 Blood Culture - Preliminary, Resulted


          No growth





Assessment/Plan


Assessment/Plan


Admission Dx


COVID-19 INFECTION


ACUTE RESPIRATORY FAILURE WITH HYPOXIA


PNEUMONIA


HYPERTENSION


WEAKNESS


Assessment and Plan


COVID-19 INFECTION


ACUTE RESPIRATORY FAILURE WITH HYPOXIA


PNEUMONIA


HYPERTENSION


WEAKNESS








COVID-19 INFECTION WITH ACUTE RESPIRATORY FAILURE WITH HYPOXIA


   - PT ON REMDESIVIR, ANTIBIOTICS, STEROIDS, BREATHING TREATMENTS.


   - OXYGEN HAS BEEN WEANED DOWN FROM VAPOTHERM TO HIGH FLOW NC, SHE WILL NEED 

TO BE EVALUATED FOR OXYGEN PRIOR TO DISCHARGE TO DETERMINE EXTENT OF NEED.





PNEUMONIA


    - SUPPORTIVE CARE, IV ANTIBIOTICS





HYPERTENSION


    - HOME MEDICATION REGIMEN DECREASED FROM 25MGam AND 50MG hs OF METOPROLOL TO

25MG BID.





WEAKNESS


   - WILL NEED THERAPY ON DC.











HEMAL CEVALLOS MD            Jul 26, 2021 08:49

## 2021-07-27 VITALS — SYSTOLIC BLOOD PRESSURE: 122 MMHG | DIASTOLIC BLOOD PRESSURE: 70 MMHG

## 2021-07-27 VITALS — DIASTOLIC BLOOD PRESSURE: 78 MMHG | SYSTOLIC BLOOD PRESSURE: 131 MMHG

## 2021-07-27 VITALS — SYSTOLIC BLOOD PRESSURE: 123 MMHG | DIASTOLIC BLOOD PRESSURE: 72 MMHG

## 2021-07-27 VITALS — DIASTOLIC BLOOD PRESSURE: 65 MMHG | SYSTOLIC BLOOD PRESSURE: 135 MMHG

## 2021-07-27 VITALS — SYSTOLIC BLOOD PRESSURE: 133 MMHG | DIASTOLIC BLOOD PRESSURE: 73 MMHG

## 2021-07-27 RX ADMIN — DOCUSATE SODIUM SCH MG: 100 CAPSULE ORAL at 08:54

## 2021-07-27 RX ADMIN — SODIUM CHLORIDE SCH MLS/HR: 900 INJECTION, SOLUTION INTRAVENOUS at 20:26

## 2021-07-27 RX ADMIN — POLYETHYLENE GLYCOL (3350) SCH GM: 17 POWDER, FOR SOLUTION ORAL at 19:48

## 2021-07-27 RX ADMIN — ONDANSETRON PRN MG: 2 INJECTION, SOLUTION INTRAMUSCULAR; INTRAVENOUS at 20:31

## 2021-07-27 RX ADMIN — ALBUTEROL SULFATE SCH PUFF: 90 AEROSOL, METERED RESPIRATORY (INHALATION) at 15:11

## 2021-07-27 RX ADMIN — ALBUTEROL SULFATE SCH PUFF: 90 AEROSOL, METERED RESPIRATORY (INHALATION) at 09:12

## 2021-07-27 RX ADMIN — GUAIFENESIN SCH MG: 600 TABLET, EXTENDED RELEASE ORAL at 20:26

## 2021-07-27 RX ADMIN — ALBUTEROL SULFATE SCH PUFF: 90 AEROSOL, METERED RESPIRATORY (INHALATION) at 03:23

## 2021-07-27 RX ADMIN — DOCUSATE SODIUM SCH MG: 100 CAPSULE ORAL at 08:50

## 2021-07-27 RX ADMIN — DOCUSATE SODIUM SCH MG: 100 CAPSULE ORAL at 19:48

## 2021-07-27 RX ADMIN — GUAIFENESIN SCH MG: 600 TABLET, EXTENDED RELEASE ORAL at 08:50

## 2021-07-27 RX ADMIN — ENOXAPARIN SODIUM SCH MG: 100 INJECTION SUBCUTANEOUS at 08:50

## 2021-07-27 RX ADMIN — SODIUM CHLORIDE SCH MLS/HR: 900 INJECTION, SOLUTION INTRAVENOUS at 08:49

## 2021-07-27 NOTE — DISCHARGE SUMMARY
Diagnosis/Chief Complaint


Date of Admission


Jul 23, 2021 at 20:40


Date of Discharge





Discharge Date:  Jul 30, 2021


Discharge Time:  13:30


Admission Diagnosis


Admission Diagnosis


COVID-19 INFECTION


ACUTE RESPIRATORY FAILURE WITH HYPOXIA


PNEUMONIA


HYPERTENSION


WEAKNESS





Discharge Diagnosis


COVID-19 INFECTION


ACUTE RESPIRATORY FAILURE WITH HYPOXIA


PNEUMONIA


HYPERTENSION


WEAKNESS





Reason Hospital Visit


54 yo F admitted for acute resp failure due to COVID 19.  She reports 2 weeks 

ago thought she had a sinus infection-  PCP saw her via telemedicine visit and 

Rx azithromycin and steroids.  That helped a little bit but the last 6 days she 

has felt really weak and laid in bed nearly all of 6 days.   is ill too 

but not quite as bad- he called EMS yesterday and she was brought to the ER.





Discharge Summary


Discharge Physical Examination


Allergies:  


Coded Allergies:  


     Sulfa (Sulfonamide Antibiotics) (Unverified  Allergy, Unknown, 9/12/18)


Vitals & I&Os





Vital Signs








  Date Time  Temp Pulse Resp B/P (MAP) Pulse Ox O2 Delivery O2 Flow Rate FiO2


 


7/30/21 11:30 36.4 80 20 101/56 (71) 94 High Flow N/C 4.50 


 


7/26/21 07:31        45








General Appearance:  Alert, Oriented X3, Cooperative, Mild Distress


HEENT:  Atraumatic, PERRLA


Respiratory:  Other (FAINT CRACKLES OF BASES)


Cardiovascular:  Regular Rate


Abdominal:  Normal Bowel Sounds, Soft, No Tenderness


Extremities:  No Clubbing, No Cyanosis, No Edema, Normal Pulses


Skin:  No Rashes, No Breakdown


Neuro:  Normal Speech, Cranial Nerves 3-12 NL


Psych/Mental Status:  Mental Status NL, Mood NL





Hospital Course


COVID-19 INFECTION


ACUTE RESPIRATORY FAILURE WITH HYPOXIA


PNEUMONIA


HYPERTENSION


WEAKNESS








COVID-19 INFECTION WITH ACUTE RESPIRATORY FAILURE WITH HYPOXIA


   - PT ON REMDESIVIR, ANTIBIOTICS, STEROIDS, BREATHING TREATMENTS.


   - OXYGEN HAS BEEN WEANED DOWN FROM VAPOTHERM TO HIGH FLOW NC, SHE WILL NEED 

TO BE EVALUATED FOR OXYGEN PRIOR TO DISCHARGE TO DETERMINE EXTENT OF NEED.


   - oxygen requirement of 5 - 6 liters with ambulation and at rest up to 3 

liters when not talking and 5 liters with any conversational exertion.





PNEUMONIA


    - SUPPORTIVE CARE, IV ANTIBIOTICS - transitioned to oral azithromycin and 

cefdinir on dc.





HYPERTENSION


    - HOME MEDICATION REGIMEN DECREASED FROM 25MGam AND 50MG hs OF METOPROLOL TO

25MG BID.





WEAKNESS


   - WILL NEED THERAPY ON DC.





   pt dc'd with home health, therapy and nursing , check cbc and cmp in one week

from dc and bedside commode to decrease her exertion from bed to bathroom since 

she has a greater than 20 foot walk to the restroom.





rtc in one week





Discharge


Condition at discharge


improving


Instructions to patient/family


Please see electronic discharge instructions given to patient.


Discharge Medications


Reviewed and agree with Discharge Medication list on patient's Discharge 

Instruction sheet











HEMAL CEVALLOS MD            Jul 27, 2021 11:09

## 2021-07-27 NOTE — DISCHARGE INST-SIMPLE/STANDARD
Discharge Inst-Standard


Reconcile Patient Problems


Problems Reviewed?:  Yes





Discharge Medications


New, Converted or Re-Newed RX:  Transmitted to Pharmacy





Patient Instructions/Follow Up


Plan of Care/Instructions/FU:  


1 wk follow up with [facility]


Activity as Tolerated:  No (stay home bound for another 7 days post discharge, 

then may be more liberal with activity)


Discharge Diet:  Regular Diet


Return to The Hospital For:  


any concern for shortness of breath, or other life threatening illness or


injury


Medication List:





Active Scripts


Active


Azithromycin 250 Mg Tablet 250 Mg PO UD


     TAKE 2 TABLETS ON DAY ONE THEN TAKE 1 TABLET DAILY FOR FOUR MORE DAYS


Mucinex (Guaifenesin) 600 Mg Tab.er.12h 600 Mg PO BID


Dexamethasone 2 Mg Tablet 2 Mg PO DAILY


Metoprolol Succinate 25 Mg Tab.er.24h 25 Mg PO BID


Ventolin Hfa (Albuterol Sulfate) 18 Gm Hfa.aer.ad 0 Gm IH RTQ2H PRN


     2-4 puffs q2hr prn shortness of breath


aspirin 325mg by mouth daily x 2 wks then decrease to 81mg daily thereafter


Reported


Probiotic (Lactobacillus Combo No.10) 1 Each Capsule 1 Each PO DAILY


Black Elderberry 575 mg Cap (Elderberry Fruit and Flower) 1 Each Capsule 1 Each 

PO DAILY


Centrum Women Tablet (Multivitamin/Iron/Folic Acid) 1 Each Tablet 1 Each PO 

DAILY


Nasacort (Triamcinolone Acetonide) 10.8 Ml Spray 1-2 Sprays NSEACH DAILY


Metoprolol Succinate 50 Mg Tab.er.24h 50 Mg PO HS


Metoprolol Succinate 25 Mg Tab.er.24h 25 Mg PO DAILY


Minocycline HCl 100 Mg Capsule 100 Mcg PO HS


Buspirone HCl 5 Mg Tablet 5 Mg PO BID


Proventil Hfa (Albuterol Sulfate) 6.7 Gm Hfa.aer.ad 1-2 Puff INH Q6H PRN


Promethazine-Codeine Solution (Promethazine HCl/Codeine) 473 Ml Syrup 5-10 Ml PO

Q6H PRN


My orders:





Orders - HEMAL CEVALLOS MD (7/26/21 15:00)


Ambulate W/O 02-Home O2 Qual (7/27/21 10:30)


Pending Discharge Order (7/27/21 11:09)











HEMAL CEVALLOS MD            Jul 27, 2021 11:19

## 2021-07-27 NOTE — PROGRESS NOTE
Subjective


Subjective


Date Seen by Provider:  Jul 27, 2021


Time Seen by Provider:  09:30


PT IS A 56 Y/O FEMALE WHO HAS DX OF COVID-19 AND WAS ADMITTED FOR RESPIRATORY 

FAILURE ATTRIBUTED TO THE INFECTION.


PT REPORTS THAT SHE IS SHORT OF BREATH WITH ANY ACTIVITY, SHE REPORTS THAT 

GETTING FROM THE BED TO THE COMMODE TWO STEPS FROM THE BED IS EXHAUSTING TO HER.





Review of Systems


General:  No Chills, No Night Sweats; Fatigue, Malaise


HEENT:  Head Aches


Pulmonary:  Dyspnea, Cough


Cardiovascular:  Chest Pain


Gastrointestinal:  No: Nausea, Vomiting, Abdominal Pain


Genitourinary:  No Dysuria


Musculoskeletal:  No: neck pain


Neurological:  Weakness; No: Confusion





All Other Systems Reviewed


All Other Systems Reviewed:  Yes





Objective


Exam


Vital Signs





Vital Signs








  Date Time  Temp Pulse Resp B/P (MAP) Pulse Ox O2 Delivery O2 Flow Rate FiO2


 


7/27/21 11:57  77   83  0.00 


 


7/27/21 11:10 36.2 69 20 131/78 (95) 97 High Flow N/C 3.00 


 


7/27/21 09:51     93 High Flow N/C 3.00 


 


7/27/21 09:12     96 High Flow N/C 3.00 


 


7/27/21 07:19 36.4 61 20 133/73 (93) 93 High Flow N/C 5.00 


 


7/27/21 07:00  54      


 


7/27/21 04:55 36.0 56 16 135/65 (88) 95 High Flow N/C 5.00 


 


7/27/21 03:23     99 High Flow N/C 5.00 


 


7/27/21 01:00  53      


 


7/26/21 23:27 36.5 68 16 126/73 (90) 96 High Flow N/C 5.00 


 


7/26/21 22:05      High Flow N/C 5.00 


 


7/26/21 19:27 36.4 69 16 116/65 (82) 99 High Flow N/C 5.00 


 


7/26/21 19:00  64      


 


7/26/21 16:00 36.6 77 18 113/55 (74) 99 High Flow N/C 6.00 














I & O 


 


 7/27/21





 07:00


 


Intake Total 2410 ml


 


Balance 2410 ml








General Appearance:  No Apparent Distress, WD/WN


Eyes:  Bilateral Eye Normal Inspection, Bilateral Eye PERRL, Bilateral Eye EOMI


HEENT:  PERRL/EOMI


Neck:  Non Tender, Supple


Respiratory:  Chest Non Tender, No Accessory Muscle Use, No Respiratory 

Distress, Decreased Breath Sounds, Rhonci


Cardiovascular:  Regular Rate, Rhythm


Gastrointestinal:  Non Tender, Soft


Rectal:  Deferred


Back:  Normal Inspection, No CVA Tenderness


Extremity:  Non Tender, No Calf Tenderness


Neurologic/Psychiatric:  Alert, Oriented x3, No Motor/Sensory Deficits, Normal 

Mood/Affect


Skin:  Warm/Dry





Results


Lab





Microbiology


7/23/21 Blood Culture - Preliminary, Resulted


          No growth





Assessment/Plan


Assessment/Plan


Admission Dx


COVID-19 INFECTION


ACUTE RESPIRATORY FAILURE WITH HYPOXIA


PNEUMONIA


HYPERTENSION


WEAKNESS


Assessment and Plan


COVID-19 INFECTION


ACUTE RESPIRATORY FAILURE WITH HYPOXIA


PNEUMONIA


HYPERTENSION


WEAKNESS








COVID-19 INFECTION WITH ACUTE RESPIRATORY FAILURE WITH HYPOXIA


   - PT ON REMDESIVIR, ANTIBIOTICS, STEROIDS, BREATHING TREATMENTS.


   - OXYGEN HAS BEEN WEANED DOWN FROM VAPOTHERM TO HIGH FLOW NC, SHE WILL NEED 

TO BE EVALUATED FOR OXYGEN PRIOR TO DISCHARGE TO DETERMINE EXTENT OF NEED.


       - SHE REQUIRED 15 LITERS OXYGEN NC WHEN ACTIVE TO KEEP OXYGEN ABOVE 90% -

CANNOT GO HOME WITH SUCH A HIGH OXYGEN REQUIREMENT.





PNEUMONIA


    - SUPPORTIVE CARE, IV ANTIBIOTICS





HYPERTENSION


    - HOME MEDICATION REGIMEN DECREASED FROM 25MGam AND 50MG hs OF METOPROLOL TO

25MG BID.





WEAKNESS


   - WILL NEED THERAPY ON DC.


Admission Dx


COVID-19 INFECTION


ACUTE RESPIRATORY FAILURE WITH HYPOXIA


PNEUMONIA


HYPERTENSION


WEAKNESS





Clinical Quality Measures


Admission Status


Admission Dx


COVID-19 INFECTION


ACUTE RESPIRATORY FAILURE WITH HYPOXIA


PNEUMONIA


HYPERTENSION


WEAKNESS











HEMAL CEVALLOS MD            Jul 27, 2021 13:40

## 2021-07-27 NOTE — DIAGNOSTIC IMAGING REPORT
INDICATION: Covid 19 pneumonia.



TIME OF EXAM: 4:06 PM



CORRELATION is made with prior chest from 07/23/2021.



The heart size is stable. There continues to be some patchy

infiltrates in the mid and lower lung fields bilaterally, similar

to prior exam. Upper lung fields appear clear. There is no

effusion. There is no pneumothorax. 



IMPRESSION: Continued bilateral pulmonary infiltrates.



Dictated by: 



  Dictated on workstation # [id number]

## 2021-07-28 VITALS — SYSTOLIC BLOOD PRESSURE: 99 MMHG | DIASTOLIC BLOOD PRESSURE: 56 MMHG

## 2021-07-28 VITALS — SYSTOLIC BLOOD PRESSURE: 121 MMHG | DIASTOLIC BLOOD PRESSURE: 70 MMHG

## 2021-07-28 VITALS — SYSTOLIC BLOOD PRESSURE: 113 MMHG | DIASTOLIC BLOOD PRESSURE: 64 MMHG

## 2021-07-28 VITALS — DIASTOLIC BLOOD PRESSURE: 74 MMHG | SYSTOLIC BLOOD PRESSURE: 127 MMHG

## 2021-07-28 VITALS — DIASTOLIC BLOOD PRESSURE: 57 MMHG | SYSTOLIC BLOOD PRESSURE: 105 MMHG

## 2021-07-28 VITALS — DIASTOLIC BLOOD PRESSURE: 59 MMHG | SYSTOLIC BLOOD PRESSURE: 102 MMHG

## 2021-07-28 LAB
ALBUMIN SERPL-MCNC: 3.5 GM/DL (ref 3.2–4.5)
ALP SERPL-CCNC: 64 U/L (ref 40–136)
ALT SERPL-CCNC: 32 U/L (ref 0–55)
BILIRUB SERPL-MCNC: 0.4 MG/DL (ref 0.1–1)
BUN/CREAT SERPL: 16
CALCIUM SERPL-MCNC: 9 MG/DL (ref 8.5–10.1)
CHLORIDE SERPL-SCNC: 102 MMOL/L (ref 98–107)
CO2 SERPL-SCNC: 28 MMOL/L (ref 21–32)
CREAT SERPL-MCNC: 0.75 MG/DL (ref 0.6–1.3)
GFR SERPLBLD BASED ON 1.73 SQ M-ARVRAT: 80 ML/MIN
GLUCOSE SERPL-MCNC: 123 MG/DL (ref 70–105)
HCT VFR BLD CALC: 37 % (ref 35–52)
HGB BLD-MCNC: 12.1 G/DL (ref 11.5–16)
MCH RBC QN AUTO: 29 PG (ref 25–34)
MCHC RBC AUTO-ENTMCNC: 32 G/DL (ref 32–36)
MCV RBC AUTO: 90 FL (ref 80–99)
PLATELET # BLD: 313 10^3/UL (ref 130–400)
PMV BLD AUTO: 10 FL (ref 9–12.2)
POTASSIUM SERPL-SCNC: 4.2 MMOL/L (ref 3.6–5)
PROT SERPL-MCNC: 6.6 GM/DL (ref 6.4–8.2)
SODIUM SERPL-SCNC: 142 MMOL/L (ref 135–145)
WBC # BLD AUTO: 11 10^3/UL (ref 4.3–11)

## 2021-07-28 RX ADMIN — ACETAMINOPHEN PRN MG: 500 TABLET ORAL at 20:42

## 2021-07-28 RX ADMIN — ALBUTEROL SULFATE PRN GM: 90 AEROSOL, METERED RESPIRATORY (INHALATION) at 07:49

## 2021-07-28 RX ADMIN — ALBUTEROL SULFATE SCH GM: 90 AEROSOL, METERED RESPIRATORY (INHALATION) at 07:45

## 2021-07-28 RX ADMIN — POLYETHYLENE GLYCOL (3350) SCH GM: 17 POWDER, FOR SOLUTION ORAL at 20:46

## 2021-07-28 RX ADMIN — GUAIFENESIN SCH MG: 600 TABLET, EXTENDED RELEASE ORAL at 08:21

## 2021-07-28 RX ADMIN — DOCUSATE SODIUM SCH MG: 100 CAPSULE ORAL at 08:21

## 2021-07-28 RX ADMIN — ALBUTEROL SULFATE SCH GM: 90 AEROSOL, METERED RESPIRATORY (INHALATION) at 20:43

## 2021-07-28 RX ADMIN — SODIUM CHLORIDE SCH MLS/HR: 900 INJECTION, SOLUTION INTRAVENOUS at 08:21

## 2021-07-28 RX ADMIN — DOCUSATE SODIUM SCH MG: 100 CAPSULE ORAL at 20:45

## 2021-07-28 RX ADMIN — GUAIFENESIN SCH MG: 600 TABLET, EXTENDED RELEASE ORAL at 20:41

## 2021-07-28 RX ADMIN — ENOXAPARIN SODIUM SCH MG: 100 INJECTION SUBCUTANEOUS at 08:21

## 2021-07-28 NOTE — PROGRESS NOTE
Subjective


Subjective


Date Seen by Provider:  Jul 28, 2021


Time Seen by Provider:  12:43


PT IS A 54 Y/O FEMALE WHO HAS DX OF COVID-19 AND WAS ADMITTED FOR RESPIRATORY 

FAILURE ATTRIBUTED TO THE INFECTION.


PT REPORTS THAT SHE IS SHORT OF BREATH WITH ANY ACTIVITY, SHE REPORTS THAT 

GETTING FROM THE BED TO THE COMMODE TWO STEPS FROM THE BED IS EXHAUSTING TO HER.





Review of Systems


General:  No Chills, No Night Sweats; Fatigue, Malaise


HEENT:  Head Aches


Pulmonary:  Dyspnea, Cough


Cardiovascular:  Chest Pain


Gastrointestinal:  No: Nausea, Vomiting, Abdominal Pain


Genitourinary:  No Dysuria


Musculoskeletal:  No: neck pain


Neurological:  Weakness; No: Confusion





All Other Systems Reviewed


All Other Systems Reviewed:  Yes





Objective


Exam


Vital Signs





Vital Signs








  Date Time  Temp Pulse Resp B/P (MAP) Pulse Ox O2 Delivery O2 Flow Rate FiO2


 


7/28/21 08:00 36.0 82 24 105/57 (73) 90 High Flow N/C 7.00 


 


7/28/21 08:00     90 High Flow N/C 7.00 


 


7/28/21 07:45     90 High Flow N/C 5.00 


 


7/28/21 05:08 36.4 55 16 121/70 (87) 93 High Flow N/C 4.00 


 


7/28/21 04:00      High Flow N/C 4.00 


 


7/28/21 01:00  59      


 


7/28/21 00:01 37.0 56 16 127/74 (91) 97 High Flow N/C 4.00 


 


7/27/21 22:00 36.4 62   98   


 


7/27/21 20:00     97 High Flow N/C 4.00 


 


7/27/21 19:24 36.4 62 16 122/70 (87) 98 High Flow N/C 5.00 


 


7/27/21 19:00  55      


 


7/27/21 19:00  53      


 


7/27/21 15:50 36.0 63 18 123/72 (89) 100 High Flow N/C 6.00 


 


7/27/21 15:11     97 High Flow N/C 6.00 


 


7/27/21 13:00  56      














I & O 


 


 7/28/21





 07:00


 


Intake Total 2040 ml


 


Balance 2040 ml








General Appearance:  No Apparent Distress, WD/WN


Eyes:  Bilateral Eye Normal Inspection, Bilateral Eye PERRL, Bilateral Eye EOMI


HEENT:  PERRL/EOMI


Neck:  Non Tender, Supple


Respiratory:  Chest Non Tender, No Accessory Muscle Use, No Respiratory 

Distress, Decreased Breath Sounds, Rhonci


Cardiovascular:  Regular Rate, Rhythm


Gastrointestinal:  Non Tender, Soft


Rectal:  Deferred


Back:  Normal Inspection, No CVA Tenderness


Extremity:  Non Tender, No Calf Tenderness


Neurologic/Psychiatric:  Alert, Oriented x3, No Motor/Sensory Deficits, Normal 

Mood/Affect


Skin:  Warm/Dry





Results


Lab


Laboratory Tests


7/28/21 05:37: 


White Blood Count 11.0, Red Blood Count 4.14, Hemoglobin 12.1, Hematocrit 37, 

Mean Corpuscular Volume 90, Mean Corpuscular Hemoglobin 29, Mean Corpuscular 

Hemoglobin Concent 32, Red Cell Distribution Width 12.1, Platelet Count 313, 

Mean Platelet Volume 10.0, Sodium Level 142, Potassium Level 4.2, Chloride Level

102, Carbon Dioxide Level 28, Anion Gap 12, Blood Urea Nitrogen 12, Creatinine 

0.75, Estimat Glomerular Filtration Rate 80, BUN/Creatinine Ratio 16, Glucose 

Level 123H, Calcium Level 9.0, Corrected Calcium 9.4, Total Bilirubin 0.4, 

Aspartate Amino Transf (AST/SGOT) 26, Alanine Aminotransferase (ALT/SGPT) 32, 

Alkaline Phosphatase 64, Total Protein 6.6, Albumin 3.5





Microbiology


7/23/21 Blood Culture - Preliminary, Resulted


          No growth





Assessment/Plan


Assessment/Plan


Admission Dx


COVID-19 INFECTION


ACUTE RESPIRATORY FAILURE WITH HYPOXIA


PNEUMONIA


HYPERTENSION


WEAKNESS


Assessment and Plan


COVID-19 INFECTION


ACUTE RESPIRATORY FAILURE WITH HYPOXIA


PNEUMONIA


HYPERTENSION


WEAKNESS








COVID-19 INFECTION WITH ACUTE RESPIRATORY FAILURE WITH HYPOXIA


   - PT ON REMDESIVIR, ANTIBIOTICS, STEROIDS, BREATHING TREATMENTS.


   - OXYGEN HAS BEEN WEANED DOWN FROM VAPOTHERM TO HIGH FLOW NC, SHE WILL NEED 

TO BE EVALUATED FOR OXYGEN PRIOR TO DISCHARGE TO DETERMINE EXTENT OF NEED.


       - SHE REQUIRED 15 LITERS OXYGEN NC WHEN ACTIVE TO KEEP OXYGEN ABOVE 90% -

CANNOT GO HOME WITH SUCH A HIGH OXYGEN REQUIREMENT.





PNEUMONIA


    - SUPPORTIVE CARE, IV ANTIBIOTICS





HYPERTENSION


    - HOME MEDICATION REGIMEN DECREASED FROM 25MGam AND 50MG hs OF METOPROLOL TO

25MG BID.





WEAKNESS


   - WILL NEED THERAPY ON DC.


Admission Dx


COVID-19 INFECTION


ACUTE RESPIRATORY FAILURE WITH HYPOXIA


PNEUMONIA


HYPERTENSION


WEAKNESS





Clinical Quality Measures


Admission Status


Admission Dx


COVID-19 INFECTION


ACUTE RESPIRATORY FAILURE WITH HYPOXIA


PNEUMONIA


HYPERTENSION


WEAKNESS











HEMAL CEVALLOS MD            Jul 28, 2021 12:43

## 2021-07-29 VITALS — SYSTOLIC BLOOD PRESSURE: 121 MMHG | DIASTOLIC BLOOD PRESSURE: 55 MMHG

## 2021-07-29 VITALS — SYSTOLIC BLOOD PRESSURE: 118 MMHG | DIASTOLIC BLOOD PRESSURE: 60 MMHG

## 2021-07-29 VITALS — DIASTOLIC BLOOD PRESSURE: 57 MMHG | SYSTOLIC BLOOD PRESSURE: 106 MMHG

## 2021-07-29 VITALS — SYSTOLIC BLOOD PRESSURE: 113 MMHG | DIASTOLIC BLOOD PRESSURE: 69 MMHG

## 2021-07-29 VITALS — SYSTOLIC BLOOD PRESSURE: 123 MMHG | DIASTOLIC BLOOD PRESSURE: 55 MMHG

## 2021-07-29 VITALS — DIASTOLIC BLOOD PRESSURE: 70 MMHG | SYSTOLIC BLOOD PRESSURE: 116 MMHG

## 2021-07-29 RX ADMIN — SODIUM CHLORIDE SCH MLS/HR: 900 INJECTION INTRAVENOUS at 09:39

## 2021-07-29 RX ADMIN — POLYETHYLENE GLYCOL (3350) SCH GM: 17 POWDER, FOR SOLUTION ORAL at 21:10

## 2021-07-29 RX ADMIN — DOCUSATE SODIUM SCH MG: 100 CAPSULE ORAL at 20:18

## 2021-07-29 RX ADMIN — GUAIFENESIN SCH MG: 600 TABLET, EXTENDED RELEASE ORAL at 08:42

## 2021-07-29 RX ADMIN — DOCUSATE SODIUM SCH MG: 100 CAPSULE ORAL at 08:41

## 2021-07-29 RX ADMIN — GUAIFENESIN SCH MG: 600 TABLET, EXTENDED RELEASE ORAL at 20:18

## 2021-07-29 RX ADMIN — ENOXAPARIN SODIUM SCH MG: 100 INJECTION SUBCUTANEOUS at 08:42

## 2021-07-29 RX ADMIN — ALBUTEROL SULFATE SCH GM: 90 AEROSOL, METERED RESPIRATORY (INHALATION) at 18:08

## 2021-07-29 RX ADMIN — ALBUTEROL SULFATE SCH GM: 90 AEROSOL, METERED RESPIRATORY (INHALATION) at 08:31

## 2021-07-29 NOTE — DIAGNOSTIC IMAGING REPORT
INDICATION: Pneumonia.



Comparison made with prior examination from  07/27/2021



FINDINGS: The heart size is normal. There are patchy bilateral

pulmonary infiltrates. There is no  pleural effusion or

pneumothorax. The mediastinum is unremarkable. 



IMPRESSION: Patchy bibasilar infiltrate suspect for atypical

pneumonia possibly COVID. Recommend clinical correlation.



Report was faxed to [facility] by luz elena at 9:27AM.



Dictated by: 



  Dictated on workstation # [id number]

## 2021-07-30 VITALS — DIASTOLIC BLOOD PRESSURE: 73 MMHG | SYSTOLIC BLOOD PRESSURE: 112 MMHG

## 2021-07-30 VITALS — SYSTOLIC BLOOD PRESSURE: 112 MMHG | DIASTOLIC BLOOD PRESSURE: 73 MMHG

## 2021-07-30 VITALS — DIASTOLIC BLOOD PRESSURE: 56 MMHG | SYSTOLIC BLOOD PRESSURE: 101 MMHG

## 2021-07-30 VITALS — DIASTOLIC BLOOD PRESSURE: 68 MMHG | SYSTOLIC BLOOD PRESSURE: 120 MMHG

## 2021-07-30 VITALS — SYSTOLIC BLOOD PRESSURE: 115 MMHG | DIASTOLIC BLOOD PRESSURE: 63 MMHG

## 2021-07-30 RX ADMIN — ALBUTEROL SULFATE SCH GM: 90 AEROSOL, METERED RESPIRATORY (INHALATION) at 07:01

## 2021-07-30 RX ADMIN — GUAIFENESIN SCH MG: 600 TABLET, EXTENDED RELEASE ORAL at 08:35

## 2021-07-30 RX ADMIN — DOCUSATE SODIUM SCH MG: 100 CAPSULE ORAL at 08:34

## 2021-07-30 RX ADMIN — SODIUM CHLORIDE SCH MLS/HR: 900 INJECTION INTRAVENOUS at 08:34

## 2021-07-30 RX ADMIN — POLYETHYLENE GLYCOL (3350) SCH GM: 17 POWDER, FOR SOLUTION ORAL at 08:35

## 2021-07-30 RX ADMIN — ENOXAPARIN SODIUM SCH MG: 100 INJECTION SUBCUTANEOUS at 08:35

## 2021-07-30 NOTE — PROGRESS NOTE
Subjective


Subjective


Date Seen by Provider:  Jul 29, 2021


Time Seen by Provider:  08:50


PT IS A 56 Y/O FEMALE WHO HAS DX OF COVID-19 AND WAS ADMITTED FOR RESPIRATORY 

FAILURE ATTRIBUTED TO THE INFECTION.


PT REPORTS THAT SHE IS SHORT OF BREATH WITH ANY ACTIVITY, BUT SHE IS BETTER THAN

YESTERDAY - SHE STATES THAT SHE FEELS LIKE SHE MAY BE READY TO GO HOME TOMORROW





Review of Systems


General:  No Chills, No Night Sweats; Fatigue, Malaise


HEENT:  Head Aches


Pulmonary:  Dyspnea, Cough


Cardiovascular:  Chest Pain


Gastrointestinal:  No: Nausea, Vomiting, Abdominal Pain


Genitourinary:  No Dysuria


Musculoskeletal:  No: neck pain


Neurological:  Weakness; No: Confusion





All Other Systems Reviewed


All Other Systems Reviewed:  Yes





Objective


Exam


Vital Signs





Vital Signs








  Date Time  Temp Pulse Resp B/P (MAP) Pulse Ox O2 Delivery O2 Flow Rate FiO2


 


7/30/21 11:30 36.4 80 20 101/56 (71) 94 High Flow N/C 4.50 


 


7/30/21 08:30      High Flow N/C 4.00 


 


7/30/21 07:57 36.7 82 20 115/63 (80) 96 High Flow N/C 4.50 


 


7/30/21 07:27  90   80   


 


7/30/21 07:02     93 Room Air 5.00 


 


7/30/21 07:00  62      


 


7/30/21 01:59     93 High Flow N/C 4.50 


 


7/30/21 01:00  56      


 


7/30/21 00:09 36.5 67 20 120/68 (85) 93 High Flow N/C 4.50 


 


7/29/21 21:25     94 High Flow N/C 4.00 


 


7/29/21 20:49     93 High Flow N/C 4.00 


 


7/29/21 19:26 36.3 71 20 106/57 (73) 97 High Flow N/C 4.50 


 


7/29/21 19:00  81      


 


7/29/21 18:08     91 High Flow N/C 5.00 


 


7/29/21 15:38 36.4 63 20 118/60 (79) 93 High Flow N/C 3.00 


 


7/29/21 12:56  69      














I & O 


 


 7/30/21





 07:00


 


Intake Total 2500 ml


 


Balance 2500 ml








General Appearance:  No Apparent Distress, WD/WN


Eyes:  Bilateral Eye Normal Inspection, Bilateral Eye PERRL, Bilateral Eye EOMI


HEENT:  PERRL/EOMI


Neck:  Non Tender, Supple


Respiratory:  Chest Non Tender, No Accessory Muscle Use, No Respiratory 

Distress, Decreased Breath Sounds, Rhonci


Cardiovascular:  Regular Rate, Rhythm


Gastrointestinal:  Non Tender, Soft


Rectal:  Deferred


Back:  Normal Inspection, No CVA Tenderness


Extremity:  Non Tender, No Calf Tenderness


Neurologic/Psychiatric:  Alert, Oriented x3, No Motor/Sensory Deficits, Normal 

Mood/Affect


Skin:  Warm/Dry





Results


Lab





Microbiology


7/23/21 Blood Culture - Final, Complete


          No growth





Assessment/Plan


Assessment/Plan


Admission Dx


COVID-19 INFECTION


ACUTE RESPIRATORY FAILURE WITH HYPOXIA


PNEUMONIA


HYPERTENSION


WEAKNESS


Assessment and Plan


COVID-19 INFECTION


ACUTE RESPIRATORY FAILURE WITH HYPOXIA


PNEUMONIA


HYPERTENSION


WEAKNESS








COVID-19 INFECTION WITH ACUTE RESPIRATORY FAILURE WITH HYPOXIA


   - PT ON REMDESIVIR, ANTIBIOTICS, STEROIDS, BREATHING TREATMENTS.


   - OXYGEN HAS BEEN WEANED DOWN FROM VAPOTHERM TO HIGH FLOW NC, SHE WILL NEED 

TO BE EVALUATED FOR OXYGEN PRIOR TO DISCHARGE TO DETERMINE EXTENT OF NEED.


       - SHE REQUIRED 15 LITERS OXYGEN NC WHEN ACTIVE TO KEEP OXYGEN ABOVE 90% -

CANNOT GO HOME WITH SUCH A HIGH OXYGEN REQUIREMENT.


   - HAVE WEANED DOWN OXYGEN FROM 10 TO 5 LITERS.


   -PT STARTED ON AZITHROMYCIN AND ROCEPHIN 





PNEUMONIA


    - SUPPORTIVE CARE, IV ANTIBIOTICS


   -PT STARTED ON AZITHROMYCIN AND ROCEPHIN 





HYPERTENSION


    - HOME MEDICATION REGIMEN DECREASED FROM 25MGam AND 50MG hs OF METOPROLOL TO

25MG BID.





WEAKNESS


   - WILL NEED THERAPY ON DC.


Admission Dx


COVID-19 INFECTION


ACUTE RESPIRATORY FAILURE WITH HYPOXIA


PNEUMONIA


HYPERTENSION


WEAKNESS





Clinical Quality Measures


Admission Status


Admission Dx


COVID-19 INFECTION


ACUTE RESPIRATORY FAILURE WITH HYPOXIA


PNEUMONIA


HYPERTENSION


WEAKNESS











HEMAL CEVALLOS MD            Jul 30, 2021 12:37

## 2021-07-30 NOTE — D/C HH FACE TO FACE ORDER
D/C  Face to Face Orders


Reconcile Patient Problems


Problems Reviewed?:  Yes





Instructions for Patient


[facility] [employer], [phone]


Patient Instructions/FollowUp:  


1 wk [facility]


Physician to follow Patient:  ella


Discharge Diet for Home:  Regular Diet


Patient Problems:  


COVID-19 INFECTION


ACUTE RESPIRATORY FAILURE WITH HYPOXIA


PNEUMONIA


HYPERTENSION


WEAKNESS


Goals for Patient:  


improved strength and weaning off of oxygen





Patient Data-Allergies,Ht & Wt


Patient Allergies:  


Coded Allergies:  


     Sulfa (Sulfonamide Antibiotics) (Unverified  Allergy, Unknown, 9/12/18)


Height (Feet):  5


Height (Inches):  8.00


Weight (Pounds):  136


Weight (Ounces):  0.0





Home Health Need/Face to Face


Date of Face to Face:  Jul 30, 2021


Clinical Findings:  Generalized weakness and fatigue, Muscle weakness, Shortness

of breath


I have seen Pt face-to-face:  Yes


Discharged To:  Home


Diagnosis/Conditions:  


COVID-19 INFECTION


ACUTE RESPIRATORY FAILURE WITH HYPOXIA


PNEUMONIA


HYPERTENSION


WEAKNESS


Patient is Homebound due to:  Shortness of breath/distress


Homebound Status


   Due to the above stated illness, injury or surgical procedure (medical 

condition or diagnosis) and associated clinical findings, the patient is 

homebound because of his/her inability to leave home except with aid of a 

supportive device and/or person AND leaving the home requires a considerable and

taxing effort or is medically contraindicated.


Pt req the following assistanc:  Aid of another person





Home Health Nursing Orders


Home Health Services Order:  Nursing Services, Physical Therapy-Evaluate & Treat





oxygen at 5 liters oxygen with ambulation and at rest


cbc, cmp in 1 week


pt for strengthening





Home Health Infusion Therapy


Line Start Date:  Jul 23, 2021





Therapy Orders


Therapy Orders:  Physical Therapy, PT to assess for OT


Therapy Specific Orders:  Increase strength/endurance


Certify Stmt


I certify that this patient is under my care and that I, a nurse practitioner or

a physician; a assistant working with me, had a face to face encounter that -

meets the physician face to face encounter requirements with this patient as 

dated.











HEMAL CEVALLOS MD            Jul 30, 2021 12:24

## 2021-09-15 ENCOUNTER — HOSPITAL ENCOUNTER (OUTPATIENT)
Dept: HOSPITAL 75 - REHAB | Age: 56
Discharge: HOME | End: 2021-09-15
Attending: FAMILY MEDICINE
Payer: COMMERCIAL

## 2021-09-15 DIAGNOSIS — R53.1: Primary | ICD-10-CM

## 2021-09-15 DIAGNOSIS — B94.8: ICD-10-CM

## 2022-01-24 ENCOUNTER — HOSPITAL ENCOUNTER (OUTPATIENT)
Dept: HOSPITAL 75 - CARD | Age: 57
End: 2022-01-24
Attending: PHYSICIAN ASSISTANT
Payer: COMMERCIAL

## 2022-01-24 DIAGNOSIS — I10: Primary | ICD-10-CM

## 2022-01-24 DIAGNOSIS — I25.10: ICD-10-CM

## 2022-01-24 PROCEDURE — 93306 TTE W/DOPPLER COMPLETE: CPT

## 2022-09-21 ENCOUNTER — APPOINTMENT (RX ONLY)
Dept: URBAN - METROPOLITAN AREA CLINIC 179 | Facility: CLINIC | Age: 57
Setting detail: DERMATOLOGY
End: 2022-09-21

## 2022-09-21 DIAGNOSIS — L82.1 OTHER SEBORRHEIC KERATOSIS: ICD-10-CM

## 2022-09-21 PROCEDURE — 17111 DESTRUCTION B9 LESIONS 15/>: CPT

## 2022-09-21 PROCEDURE — ? LIQUID NITROGEN

## 2022-09-21 PROCEDURE — ? COUNSELING

## 2022-09-21 ASSESSMENT — LOCATION DETAILED DESCRIPTION DERM
LOCATION DETAILED: LEFT INFERIOR MEDIAL MIDBACK
LOCATION DETAILED: RIGHT SUPERIOR LATERAL LOWER BACK
LOCATION DETAILED: LEFT LATERAL ABDOMEN
LOCATION DETAILED: RIGHT LATERAL BREAST 6-7:00 REGION
LOCATION DETAILED: SUPERIOR THORACIC SPINE
LOCATION DETAILED: LEFT LATERAL BREAST 4-5:00 REGION
LOCATION DETAILED: LEFT INFERIOR UPPER BACK
LOCATION DETAILED: RIGHT INFERIOR UPPER BACK
LOCATION DETAILED: RIGHT LATERAL PROXIMAL PRETIBIAL REGION
LOCATION DETAILED: LEFT INFRAMAMMARY CREASE (OUTER QUADRANT)
LOCATION DETAILED: LEFT SUPERIOR MEDIAL LOWER BACK
LOCATION DETAILED: PERIUMBILICAL SKIN
LOCATION DETAILED: LEFT MEDIAL UPPER BACK
LOCATION DETAILED: LEFT MEDIAL BREAST 8-9:00 REGION
LOCATION DETAILED: RIGHT INFERIOR LATERAL MIDBACK
LOCATION DETAILED: RIGHT INFERIOR LATERAL UPPER BACK
LOCATION DETAILED: LEFT RIB CAGE
LOCATION DETAILED: LEFT LATERAL BREAST 5-6:00 REGION
LOCATION DETAILED: RIGHT INFERIOR MEDIAL MIDBACK
LOCATION DETAILED: RIGHT LATERAL ABDOMEN
LOCATION DETAILED: RIGHT RIB CAGE
LOCATION DETAILED: LEFT MID-UPPER BACK
LOCATION DETAILED: RIGHT MEDIAL UPPER BACK
LOCATION DETAILED: INFERIOR THORACIC SPINE
LOCATION DETAILED: RIGHT SUPERIOR MEDIAL MIDBACK
LOCATION DETAILED: EPIGASTRIC SKIN
LOCATION DETAILED: LEFT LATERAL UPPER BACK
LOCATION DETAILED: RIGHT INFRAMAMMARY CREASE (OUTER QUADRANT)
LOCATION DETAILED: RIGHT MEDIAL BREAST 5-6:00 REGION
LOCATION DETAILED: SUPERIOR LUMBAR SPINE
LOCATION DETAILED: LEFT SUPERIOR LATERAL LOWER BACK
LOCATION DETAILED: SUBXIPHOID
LOCATION DETAILED: LEFT MEDIAL BREAST 6-7:00 REGION
LOCATION DETAILED: LEFT INFERIOR LATERAL UPPER BACK
LOCATION DETAILED: RIGHT PERIAREOLAR BREAST 6-7:00 REGION
LOCATION DETAILED: LEFT INFERIOR MEDIAL UPPER BACK
LOCATION DETAILED: RIGHT MEDIAL BREAST 4-5:00 REGION
LOCATION DETAILED: LEFT INFRAMAMMARY CREASE
LOCATION DETAILED: RIGHT MID-UPPER BACK

## 2022-09-21 ASSESSMENT — LOCATION ZONE DERM
LOCATION ZONE: TRUNK
LOCATION ZONE: LEG

## 2022-09-21 ASSESSMENT — LOCATION SIMPLE DESCRIPTION DERM
LOCATION SIMPLE: ABDOMEN
LOCATION SIMPLE: LEFT UPPER BACK
LOCATION SIMPLE: RIGHT BREAST
LOCATION SIMPLE: LEFT LOWER BACK
LOCATION SIMPLE: RIGHT LOWER BACK
LOCATION SIMPLE: RIGHT UPPER BACK
LOCATION SIMPLE: RIGHT PRETIBIAL REGION
LOCATION SIMPLE: UPPER BACK
LOCATION SIMPLE: LEFT BREAST
LOCATION SIMPLE: LOWER BACK

## 2022-09-21 ASSESSMENT — PAIN INTENSITY VAS: HOW INTENSE IS YOUR PAIN 0 BEING NO PAIN, 10 BEING THE MOST SEVERE PAIN POSSIBLE?: NO PAIN

## 2022-09-21 NOTE — PROCEDURE: LIQUID NITROGEN

## 2023-03-21 ENCOUNTER — APPOINTMENT (RX ONLY)
Dept: URBAN - METROPOLITAN AREA CLINIC 51 | Facility: CLINIC | Age: 58
Setting detail: DERMATOLOGY
End: 2023-03-21

## 2023-03-21 DIAGNOSIS — L82.1 OTHER SEBORRHEIC KERATOSIS: ICD-10-CM | Status: WORSENING

## 2023-03-21 DIAGNOSIS — L71.0 PERIORAL DERMATITIS: ICD-10-CM | Status: WORSENING

## 2023-03-21 PROCEDURE — ? COUNSELING

## 2023-03-21 PROCEDURE — 99213 OFFICE O/P EST LOW 20 MIN: CPT | Mod: 25

## 2023-03-21 PROCEDURE — ? PRESCRIPTION

## 2023-03-21 PROCEDURE — ? TREATMENT REGIMEN

## 2023-03-21 PROCEDURE — 17111 DESTRUCTION B9 LESIONS 15/>: CPT

## 2023-03-21 PROCEDURE — ? LIQUID NITROGEN

## 2023-03-21 PROCEDURE — ? MEDICATION COUNSELING

## 2023-03-21 RX ORDER — MINOCYCLINE HYDROCHLORIDE 100 MG/1
CAPSULE ORAL QD
Qty: 30 | Refills: 6 | Status: ERX

## 2023-03-21 ASSESSMENT — LOCATION DETAILED DESCRIPTION DERM
LOCATION DETAILED: LEFT SUPERIOR FLANK
LOCATION DETAILED: LEFT INFRAMAMMARY CREASE (OUTER QUADRANT)
LOCATION DETAILED: RIGHT MEDIAL UPPER BACK
LOCATION DETAILED: RIGHT INFERIOR UPPER BACK
LOCATION DETAILED: RIGHT RIB CAGE
LOCATION DETAILED: LEFT INFERIOR LATERAL UPPER BACK
LOCATION DETAILED: RIGHT SUPERIOR MEDIAL MIDBACK
LOCATION DETAILED: LEFT SUPERIOR LATERAL MIDBACK
LOCATION DETAILED: RIGHT SUPERIOR LATERAL MIDBACK
LOCATION DETAILED: LEFT INFERIOR FLANK
LOCATION DETAILED: LEFT INFERIOR MEDIAL MALAR CHEEK
LOCATION DETAILED: LEFT SUPERIOR MEDIAL LOWER BACK
LOCATION DETAILED: LEFT INFERIOR MEDIAL MIDBACK
LOCATION DETAILED: LEFT INFERIOR UPPER BACK
LOCATION DETAILED: RIGHT INFERIOR LATERAL MIDBACK
LOCATION DETAILED: RIGHT LATERAL UPPER BACK
LOCATION DETAILED: RIGHT SUPERIOR FLANK
LOCATION DETAILED: RIGHT LATERAL ABDOMEN
LOCATION DETAILED: LEFT RIB CAGE
LOCATION DETAILED: SUBXIPHOID
LOCATION DETAILED: RIGHT INFERIOR LATERAL UPPER BACK
LOCATION DETAILED: LEFT INFERIOR MEDIAL LOWER BACK
LOCATION DETAILED: RIGHT INFERIOR FLANK
LOCATION DETAILED: RIGHT MID-UPPER BACK

## 2023-03-21 ASSESSMENT — INVESTIGATOR STATIC GLOBAL ASSESSMENT: IN YOUR EXPERIENCE, AMONG ALL PATIENTS YOU HAVE SEEN WITH THIS CONDITION, HOW SEVERE IS THIS PATIENT'S CONDITION?: MILD

## 2023-03-21 ASSESSMENT — LOCATION SIMPLE DESCRIPTION DERM
LOCATION SIMPLE: LEFT LOWER BACK
LOCATION SIMPLE: LEFT CHEEK
LOCATION SIMPLE: ABDOMEN
LOCATION SIMPLE: LEFT UPPER BACK
LOCATION SIMPLE: RIGHT LOWER BACK
LOCATION SIMPLE: LEFT BREAST
LOCATION SIMPLE: RIGHT UPPER BACK

## 2023-03-21 ASSESSMENT — LOCATION ZONE DERM
LOCATION ZONE: TRUNK
LOCATION ZONE: FACE

## 2023-03-21 ASSESSMENT — PAIN INTENSITY VAS: HOW INTENSE IS YOUR PAIN 0 BEING NO PAIN, 10 BEING THE MOST SEVERE PAIN POSSIBLE?: NO PAIN

## 2023-03-21 NOTE — PROCEDURE: LIQUID NITROGEN
Render Post-Care Instructions In Note?: yes
Include Z78.9 (Other Specified Conditions Influencing Health Status) As An Associated Diagnosis?: No
Spray Paint Text: The liquid nitrogen was applied to the skin utilizing a spray paint frosting technique.
Medical Necessity Clause: This procedure was medically necessary because the lesions that were treated were:
Medical Necessity Information: It is in your best interest to select a reason for this procedure from the list below. All of these items fulfill various CMS LCD requirements except the new and changing color options.
Consent: The patient's consent was obtained including but not limited to risks of crusting, scabbing, blistering, scarring, darker or lighter pigmentary change, recurrence, incomplete removal and infection.
Detail Level: Detailed
Post-Care Instructions: I reviewed with the patient in detail post-care instructions. Patient is to wear sunprotection, and avoid picking at any of the treated lesions. Pt may apply Vaseline to crusted or scabbing areas.

## 2023-08-14 ENCOUNTER — APPOINTMENT (RX ONLY)
Dept: URBAN - METROPOLITAN AREA CLINIC 51 | Facility: CLINIC | Age: 58
Setting detail: DERMATOLOGY
End: 2023-08-14

## 2023-08-14 DIAGNOSIS — L71.0 PERIORAL DERMATITIS: ICD-10-CM

## 2023-08-14 DIAGNOSIS — L82.1 OTHER SEBORRHEIC KERATOSIS: ICD-10-CM | Status: STABLE

## 2023-08-14 DIAGNOSIS — L82.0 INFLAMED SEBORRHEIC KERATOSIS: ICD-10-CM | Status: STABLE

## 2023-08-14 PROCEDURE — ? LIQUID NITROGEN

## 2023-08-14 PROCEDURE — ? COUNSELING

## 2023-08-14 PROCEDURE — ? PRESCRIPTION

## 2023-08-14 PROCEDURE — ? MEDICATION COUNSELING

## 2023-08-14 PROCEDURE — 99213 OFFICE O/P EST LOW 20 MIN: CPT | Mod: 25

## 2023-08-14 PROCEDURE — 17110 DESTRUCTION B9 LES UP TO 14: CPT

## 2023-08-14 RX ORDER — MINOCYCLINE HYDROCHLORIDE 100 MG/1
1 CAPSULE ORAL QD
Qty: 30 | Refills: 6 | Status: ERX

## 2023-08-14 ASSESSMENT — LOCATION DETAILED DESCRIPTION DERM
LOCATION DETAILED: LEFT LATERAL ABDOMEN
LOCATION DETAILED: LEFT INFERIOR MEDIAL MALAR CHEEK
LOCATION DETAILED: RIGHT VENTRAL DISTAL FOREARM
LOCATION DETAILED: RIGHT INFERIOR LATERAL MIDBACK
LOCATION DETAILED: LEFT SUPERIOR MEDIAL LOWER BACK
LOCATION DETAILED: LEFT INFERIOR MEDIAL UPPER BACK

## 2023-08-14 ASSESSMENT — LOCATION ZONE DERM
LOCATION ZONE: TRUNK
LOCATION ZONE: ARM
LOCATION ZONE: FACE

## 2023-08-14 ASSESSMENT — LOCATION SIMPLE DESCRIPTION DERM
LOCATION SIMPLE: RIGHT FOREARM
LOCATION SIMPLE: LEFT LOWER BACK
LOCATION SIMPLE: LEFT CHEEK
LOCATION SIMPLE: ABDOMEN
LOCATION SIMPLE: RIGHT LOWER BACK
LOCATION SIMPLE: LEFT UPPER BACK

## 2023-08-14 ASSESSMENT — PAIN INTENSITY VAS: HOW INTENSE IS YOUR PAIN 0 BEING NO PAIN, 10 BEING THE MOST SEVERE PAIN POSSIBLE?: NO PAIN

## 2023-08-14 NOTE — PROCEDURE: LIQUID NITROGEN
Detail Level: Detailed
Show Applicator Variable?: Yes
Spray Paint Text: The liquid nitrogen was applied to the skin utilizing a spray paint frosting technique.
Add 52 Modifier (Optional): no
Consent: The patient's consent was obtained including but not limited to risks of crusting, scabbing, blistering, scarring, darker or lighter pigmentary change, recurrence, incomplete removal and infection.
Medical Necessity Information: It is in your best interest to select a reason for this procedure from the list below. All of these items fulfill various CMS LCD requirements except the new and changing color options.
Medical Necessity Clause: This procedure was medically necessary because the lesions that were treated were:
Post-Care Instructions: I reviewed with the patient in detail post-care instructions. Patient is to wear sunprotection, and avoid picking at any of the treated lesions. Pt may apply Vaseline to crusted or scabbing areas.

## 2024-04-01 ENCOUNTER — APPOINTMENT (RX ONLY)
Dept: URBAN - METROPOLITAN AREA CLINIC 51 | Facility: CLINIC | Age: 59
Setting detail: DERMATOLOGY
End: 2024-04-01

## 2024-04-01 DIAGNOSIS — B35.1 TINEA UNGUIUM: ICD-10-CM | Status: INADEQUATELY CONTROLLED

## 2024-04-01 DIAGNOSIS — L71.8 OTHER ROSACEA: ICD-10-CM | Status: STABLE

## 2024-04-01 DIAGNOSIS — L82.1 OTHER SEBORRHEIC KERATOSIS: ICD-10-CM | Status: INADEQUATELY CONTROLLED

## 2024-04-01 DIAGNOSIS — Z79.899 OTHER LONG TERM (CURRENT) DRUG THERAPY: ICD-10-CM

## 2024-04-01 PROCEDURE — 99213 OFFICE O/P EST LOW 20 MIN: CPT | Mod: 25

## 2024-04-01 PROCEDURE — ? NAIL CLIPPING FOR PAS

## 2024-04-01 PROCEDURE — ? ORDER TESTS

## 2024-04-01 PROCEDURE — ? PRESCRIPTION

## 2024-04-01 PROCEDURE — ? MEDICATION COUNSELING

## 2024-04-01 PROCEDURE — ? LIQUID NITROGEN

## 2024-04-01 PROCEDURE — ? COUNSELING

## 2024-04-01 PROCEDURE — 17111 DESTRUCTION B9 LESIONS 15/>: CPT

## 2024-04-01 RX ORDER — TERBINAFINE HYDROCHLORIDE 250 MG/1
ONE TABLET ORAL QD
Qty: 30 | Refills: 0 | Status: ERX | COMMUNITY
Start: 2024-04-01

## 2024-04-01 RX ORDER — TRETIONIN 0.5 MG/G
CREAM TOPICAL
Qty: 45 | Refills: 2 | Status: ERX | COMMUNITY
Start: 2024-04-01

## 2024-04-01 RX ADMIN — TRETIONIN THIN LAYER: 0.5 CREAM TOPICAL at 00:00

## 2024-04-01 RX ADMIN — TERBINAFINE HYDROCHLORIDE ONE: 250 TABLET ORAL at 00:00

## 2024-04-01 ASSESSMENT — LOCATION SIMPLE DESCRIPTION DERM
LOCATION SIMPLE: LEFT CHEEK
LOCATION SIMPLE: CHEST
LOCATION SIMPLE: LEFT UPPER BACK
LOCATION SIMPLE: RIGHT CHEEK
LOCATION SIMPLE: RIGHT 3RD TOE
LOCATION SIMPLE: RIGHT LOWER BACK
LOCATION SIMPLE: LEFT PRETIBIAL REGION
LOCATION SIMPLE: LEFT LOWER BACK
LOCATION SIMPLE: RIGHT PRETIBIAL REGION
LOCATION SIMPLE: ABDOMEN

## 2024-04-01 ASSESSMENT — LOCATION DETAILED DESCRIPTION DERM
LOCATION DETAILED: LEFT SUPERIOR FLANK
LOCATION DETAILED: LEFT INFERIOR UPPER BACK
LOCATION DETAILED: RIGHT DISTAL PRETIBIAL REGION
LOCATION DETAILED: LEFT CENTRAL MALAR CHEEK
LOCATION DETAILED: RIGHT SUPERIOR LATERAL MIDBACK
LOCATION DETAILED: RIGHT INFERIOR CENTRAL MALAR CHEEK
LOCATION DETAILED: PERIUMBILICAL SKIN
LOCATION DETAILED: RIGHT SUPERIOR MEDIAL MIDBACK
LOCATION DETAILED: LOWER STERNUM
LOCATION DETAILED: RIGHT 3RD TOENAIL
LOCATION DETAILED: LEFT SUPERIOR LATERAL MIDBACK
LOCATION DETAILED: RIGHT SUPERIOR FLANK
LOCATION DETAILED: LEFT LATERAL ABDOMEN
LOCATION DETAILED: LEFT DISTAL PRETIBIAL REGION
LOCATION DETAILED: RIGHT LATERAL ABDOMEN
LOCATION DETAILED: RIGHT INFERIOR MEDIAL MIDBACK
LOCATION DETAILED: LEFT INFERIOR FLANK
LOCATION DETAILED: LEFT INFERIOR LATERAL MIDBACK

## 2024-04-01 ASSESSMENT — PAIN INTENSITY VAS: HOW INTENSE IS YOUR PAIN 0 BEING NO PAIN, 10 BEING THE MOST SEVERE PAIN POSSIBLE?: NO PAIN

## 2024-04-01 ASSESSMENT — LOCATION ZONE DERM
LOCATION ZONE: LEG
LOCATION ZONE: TRUNK
LOCATION ZONE: FACE
LOCATION ZONE: TOENAIL

## 2024-04-01 ASSESSMENT — SEVERITY ASSESSMENT OVERALL AMONG ALL PATIENTS: IN YOUR EXPERIENCE, AMONG ALL PATIENTS YOU HAVE SEEN WITH THIS CONDITION, HOW SEVERE IS THIS PATIENT'S CONDITION?: CLEAR

## 2024-04-01 ASSESSMENT — NAIL INVOLVEMENT PERCENT: % OF NAIL(S) INVOLVED WITH INFECTION: 1

## 2024-04-01 NOTE — PROCEDURE: NAIL CLIPPING FOR PAS
Detail Level: Detailed
Render Path Notes In Note?: No
Lab: [id number]
Lab Facility: [id number]
Billing Type: Third-Party Bill

## 2024-04-01 NOTE — PROCEDURE: LIQUID NITROGEN
Detail Level: Detailed
Duration Of Freeze Thaw-Cycle (Seconds): 0
Medical Necessity Information: It is in your best interest to select a reason for this procedure from the list below. All of these items fulfill various CMS LCD requirements except the new and changing color options.
Add 52 Modifier (Optional): no
Show Spray Paint Technique Variable?: Yes
Spray Paint Text: The liquid nitrogen was applied to the skin utilizing a spray paint frosting technique.
Consent: The patient's consent was obtained including but not limited to risks of crusting, scabbing, blistering, scarring, darker or lighter pigmentary change, recurrence, incomplete removal and infection.
Medical Necessity Clause: This procedure was medically necessary because the lesions that were treated were:
Post-Care Instructions: I reviewed with the patient in detail post-care instructions. Patient is to wear sunprotection, and avoid picking at any of the treated lesions. Pt may apply Vaseline to crusted or scabbing areas.

## 2024-04-01 NOTE — PROCEDURE: MEDICATION COUNSELING
Tazorac Pregnancy And Lactation Text: This medication is not safe during pregnancy. It is unknown if this medication is excreted in breast milk.
Colchicine Counseling:  Patient counseled regarding adverse effects including but not limited to stomach upset (nausea, vomiting, stomach pain, or diarrhea).  Patient instructed to limit alcohol consumption while taking this medication.  Colchicine may reduce blood counts especially with prolonged use.  The patient understands that monitoring of kidney function and blood counts may be required, especially at baseline. The patient verbalized understanding of the proper use and possible adverse effects of colchicine.  All of the patient's questions and concerns were addressed.
Enbrel Pregnancy And Lactation Text: This medication is Pregnancy Category B and is considered safe during pregnancy. It is unknown if this medication is excreted in breast milk.
Glycopyrrolate Pregnancy And Lactation Text: This medication is Pregnancy Category B and is considered safe during pregnancy. It is unknown if it is excreted breast milk.
Bimzelx Counseling:  I discussed with the patient the risks of Bimzelx including but not limited to depression, immunosuppression, allergic reactions and infections.  The patient understands that monitoring is required including a PPD at baseline and must alert us or the primary physician if symptoms of infection or other concerning signs are noted.
SSKI Counseling:  I discussed with the patient the risks of SSKI including but not limited to thyroid abnormalities, metallic taste, GI upset, fever, headache, acne, arthralgias, paraesthesias, lymphadenopathy, easy bleeding, arrhythmias, and allergic reaction.
Finasteride Male Counseling: Finasteride Counseling:  I discussed with the patient the risks of use of finasteride including but not limited to decreased libido, decreased ejaculate volume, gynecomastia, and depression. Women should not handle medication.  All of the patient's questions and concerns were addressed.
Doxycycline Pregnancy And Lactation Text: This medication is Pregnancy Category D and not consider safe during pregnancy. It is also excreted in breast milk but is considered safe for shorter treatment courses.
Sarecycline Counseling: Patient advised regarding possible photosensitivity and discoloration of the teeth, skin, lips, tongue and gums.  Patient instructed to avoid sunlight, if possible.  When exposed to sunlight, patients should wear protective clothing, sunglasses, and sunscreen.  The patient was instructed to call the office immediately if the following severe adverse effects occur:  hearing changes, easy bruising/bleeding, severe headache, or vision changes.  The patient verbalized understanding of the proper use and possible adverse effects of sarecycline.  All of the patient's questions and concerns were addressed.
Hydroxyzine Counseling: Patient advised that the medication is sedating and not to drive a car after taking this medication.  Patient informed of potential adverse effects including but not limited to dry mouth, urinary retention, and blurry vision.  The patient verbalized understanding of the proper use and possible adverse effects of hydroxyzine.  All of the patient's questions and concerns were addressed.
Zyclara Counseling:  I discussed with the patient the risks of imiquimod including but not limited to erythema, scaling, itching, weeping, crusting, and pain.  Patient understands that the inflammatory response to imiquimod is variable from person to person and was educated regarded proper titration schedule.  If flu-like symptoms develop, patient knows to discontinue the medication and contact us.
Prednisone Pregnancy And Lactation Text: This medication is Pregnancy Category C and it isn't know if it is safe during pregnancy. This medication is excreted in breast milk.
Rhofade Counseling: Rhofade is a topical medication which can decrease superficial blood flow where applied. Side effects are uncommon and include stinging, redness and allergic reactions.
Itraconazole Counseling:  I discussed with the patient the risks of itraconazole including but not limited to liver damage, nausea/vomiting, neuropathy, and severe allergy.  The patient understands that this medication is best absorbed when taken with acidic beverages such as non-diet cola or ginger ale.  The patient understands that monitoring is required including baseline LFTs and repeat LFTs at intervals.  The patient understands that they are to contact us or the primary physician if concerning signs are noted.
Cellcept Counseling:  I discussed with the patient the risks of mycophenolate mofetil including but not limited to infection/immunosuppression, GI upset, hypokalemia, hypercholesterolemia, bone marrow suppression, lymphoproliferative disorders, malignancy, GI ulceration/bleed/perforation, colitis, interstitial lung disease, kidney failure, progressive multifocal leukoencephalopathy, and birth defects.  The patient understands that monitoring is required including a baseline creatinine and regular CBC testing. In addition, patient must alert us immediately if symptoms of infection or other concerning signs are noted.
Eucrisa Counseling: Patient may experience a mild burning sensation during topical application. Eucrisa is not approved in children less than 2 years of age.
Topical Sulfur Applications Pregnancy And Lactation Text: This medication is Pregnancy Category C and has an unknown safety profile during pregnancy. It is unknown if this topical medication is excreted in breast milk.
Olanzapine Pregnancy And Lactation Text: This medication is pregnancy category C.   There are no adequate and well controlled trials with olanzapine in pregnant females.  Olanzapine should be used during pregnancy only if the potential benefit justifies the potential risk to the fetus.   In a study in lactating healthy women, olanzapine was excreted in breast milk.  It is recommended that women taking olanzapine should not breast feed.
Taltz Counseling: I discussed with the patient the risks of ixekizumab including but not limited to immunosuppression, serious infections, worsening of inflammatory bowel disease and drug reactions.  The patient understands that monitoring is required including a PPD at baseline and must alert us or the primary physician if symptoms of infection or other concerning signs are noted.
Azithromycin Counseling:  I discussed with the patient the risks of azithromycin including but not limited to GI upset, allergic reaction, drug rash, diarrhea, and yeast infections.
Isotretinoin Pregnancy And Lactation Text: This medication is Pregnancy Category X and is considered extremely dangerous during pregnancy. It is unknown if it is excreted in breast milk.
Rituxan Pregnancy And Lactation Text: This medication is Pregnancy Category C and it isn't know if it is safe during pregnancy. It is unknown if this medication is excreted in breast milk but similar antibodies are known to be excreted.
Sotyktu Counseling:  I discussed the most common side effects of Sotyktu including: common cold, sore throat, sinus infections, cold sores, canker sores, folliculitis, and acne.? I also discussed more serious side effects of Sotyktu including but not limited to: serious allergic reactions; increased risk for infections such as TB; cancers such as lymphomas; rhabdomyolysis and elevated CPK; and elevated triglycerides and liver enzymes.?
Mirvaso Pregnancy And Lactation Text: This medication has not been assigned a Pregnancy Risk Category. It is unknown if the medication is excreted in breast milk.
Rifampin Pregnancy And Lactation Text: This medication is Pregnancy Category C and it isn't know if it is safe during pregnancy. It is also excreted in breast milk and should not be used if you are breast feeding.
Colchicine Pregnancy And Lactation Text: This medication is Pregnancy Category C and isn't considered safe during pregnancy. It is excreted in breast milk.
Carac Counseling:  I discussed with the patient the risks of Carac including but not limited to erythema, scaling, itching, weeping, crusting, and pain.
Hydroxychloroquine Counseling:  I discussed with the patient that a baseline ophthalmologic exam is needed at the start of therapy and every year thereafter while on therapy. A CBC may also be warranted for monitoring.  The side effects of this medication were discussed with the patient, including but not limited to agranulocytosis, aplastic anemia, seizures, rashes, retinopathy, and liver toxicity. Patient instructed to call the office should any adverse effect occur.  The patient verbalized understanding of the proper use and possible adverse effects of Plaquenil.  All the patient's questions and concerns were addressed.
Humira Counseling:  I discussed with the patient the risks of adalimumab including but not limited to myelosuppression, immunosuppression, autoimmune hepatitis, demyelinating diseases, lymphoma, and serious infections.  The patient understands that monitoring is required including a PPD at baseline and must alert us or the primary physician if symptoms of infection or other concerning signs are noted.
Topical Clindamycin Counseling: Patient counseled that this medication may cause skin irritation or allergic reactions.  In the event of skin irritation, the patient was advised to reduce the amount of the drug applied or use it less frequently.   The patient verbalized understanding of the proper use and possible adverse effects of clindamycin.  All of the patient's questions and concerns were addressed.
Sski Pregnancy And Lactation Text: This medication is Pregnancy Category D and isn't considered safe during pregnancy. It is excreted in breast milk.
Bimzelx Pregnancy And Lactation Text: This medication crosses the placenta and the safety is uncertain during pregnancy. It is unknown if this medication is present in breast milk.
Erivedge Pregnancy And Lactation Text: This medication is Pregnancy Category X and is absolutely contraindicated during pregnancy. It is unknown if it is excreted in breast milk.
Ketoconazole Counseling:   Patient counseled regarding improving absorption with orange juice.  Adverse effects include but are not limited to breast enlargement, headache, diarrhea, nausea, upset stomach, liver function test abnormalities, taste disturbance, and stomach pain.  There is a rare possibility of liver failure that can occur when taking ketoconazole. The patient understands that monitoring of LFTs may be required, especially at baseline. The patient verbalized understanding of the proper use and possible adverse effects of ketoconazole.  All of the patient's questions and concerns were addressed.
Finasteride Female Counseling: Finasteride Counseling:  I discussed with the patient the risks of use of finasteride including but not limited to decreased libido and sexual dysfunction. Explained the teratogenic nature of the medication and stressed the importance of not getting pregnant during treatment. All of the patient's questions and concerns were addressed.
Hydroxyzine Pregnancy And Lactation Text: This medication is not safe during pregnancy and should not be taken. It is also excreted in breast milk and breast feeding isn't recommended.
Erythromycin Counseling:  I discussed with the patient the risks of erythromycin including but not limited to GI upset, allergic reaction, drug rash, diarrhea, increase in liver enzymes, and yeast infections.
Zyclara Pregnancy And Lactation Text: This medication is Pregnancy Category C. It is unknown if this medication is excreted in breast milk.
Taltz Pregnancy And Lactation Text: The risk during pregnancy and breastfeeding is uncertain with this medication.
Oral Minoxidil Counseling- I discussed with the patient the risks of oral minoxidil including but not limited to shortness of breath, swelling of the feet or ankles, dizziness, lightheadedness, unwanted hair growth and allergic reaction.  The patient verbalized understanding of the proper use and possible adverse effects of oral minoxidil.  All of the patient's questions and concerns were addressed.
Cellcept Pregnancy And Lactation Text: This medication is Pregnancy Category D and isn't considered safe during pregnancy. It is unknown if this medication is excreted in breast milk.
Sotyktu Pregnancy And Lactation Text: There is insufficient data to evaluate whether or not Sotyktu is safe to use during pregnancy.? ?It is not known if Sotyktu passes into breast milk and whether or not it is safe to use when breastfeeding.??
Griseofulvin Pregnancy And Lactation Text: This medication is Pregnancy Category X and is known to cause serious birth defects. It is unknown if this medication is excreted in breast milk but breast feeding should be avoided.
Carac Pregnancy And Lactation Text: This medication is Pregnancy Category X and contraindicated in pregnancy and in women who may become pregnant. It is unknown if this medication is excreted in breast milk.
Wartpeel Counseling:  I discussed with the patient the risks of Wartpeel including but not limited to erythema, scaling, itching, weeping, crusting, and pain.
Azithromycin Pregnancy And Lactation Text: This medication is considered safe during pregnancy and is also secreted in breast milk.
Siliq Counseling:  I discussed with the patient the risks of Siliq including but not limited to new or worsening depression, suicidal thoughts and behavior, immunosuppression, malignancy, posterior leukoencephalopathy syndrome, and serious infections.  The patient understands that monitoring is required including a PPD at baseline and must alert us or the primary physician if symptoms of infection or other concerning signs are noted. There is also a special program designed to monitor depression which is required with Siliq.
Opzelura Counseling:  I discussed with the patient the risks of Opzelura including but not limited to nasopharngitis, bronchitis, ear infection, eosinophila, hives, diarrhea, folliculitis, tonsillitis, and rhinorrhea.  Taken orally, this medication has been linked to serious infections; higher rate of mortality; malignancy and lymphoproliferative disorders; major adverse cardiovascular events; thrombosis; thrombocytopenia, anemia, and neutropenia; and lipid elevations.
High Dose Vitamin A Counseling: Side effects reviewed, pt to contact office should one occur.
Dapsone Counseling: I discussed with the patient the risks of dapsone including but not limited to hemolytic anemia, agranulocytosis, rashes, methemoglobinemia, kidney failure, peripheral neuropathy, headaches, GI upset, and liver toxicity.  Patients who start dapsone require monitoring including baseline LFTs and weekly CBCs for the first month, then every month thereafter.  The patient verbalized understanding of the proper use and possible adverse effects of dapsone.  All of the patient's questions and concerns were addressed.
Cibinqo Counseling: I discussed with the patient the risks of Cibinqo therapy including but not limited to common cold, nausea, headache, cold sores, increased blood CPK levels, dizziness, UTIs, fatigue, acne, and vomitting. Live vaccines should be avoided.  This medication has been linked to serious infections; higher rate of mortality; malignancy and lymphoproliferative disorders; major adverse cardiovascular events; thrombosis; thrombocytopenia and lymphopenia; lipid elevations; and retinal detachment.
Hydroxychloroquine Pregnancy And Lactation Text: This medication has been shown to cause fetal harm but it isn't assigned a Pregnancy Risk Category. There are small amounts excreted in breast milk.
Thalidomide Counseling: I discussed with the patient the risks of thalidomide including but not limited to birth defects, anxiety, weakness, chest pain, dizziness, cough and severe allergy.
Cimzia Counseling:  I discussed with the patient the risks of Cimzia including but not limited to immunosuppression, allergic reactions and infections.  The patient understands that monitoring is required including a PPD at baseline and must alert us or the primary physician if symptoms of infection or other concerning signs are noted.
Topical Clindamycin Pregnancy And Lactation Text: This medication is Pregnancy Category B and is considered safe during pregnancy. It is unknown if it is excreted in breast milk.
Libtayo Counseling- I discussed with the patient the risks of Libtayo including but not limited to nausea, vomiting, diarrhea, and bone or muscle pain.  The patient verbalized understanding of the proper use and possible adverse effects of Libtayo.  All of the patient's questions and concerns were addressed.
Ketoconazole Pregnancy And Lactation Text: This medication is Pregnancy Category C and it isn't know if it is safe during pregnancy. It is also excreted in breast milk and breast feeding isn't recommended.
Solaraze Counseling:  I discussed with the patient the risks of Solaraze including but not limited to erythema, scaling, itching, weeping, crusting, and pain.
Erythromycin Pregnancy And Lactation Text: This medication is Pregnancy Category B and is considered safe during pregnancy. It is also excreted in breast milk.
Finasteride Pregnancy And Lactation Text: This medication is absolutely contraindicated during pregnancy. It is unknown if it is excreted in breast milk.
Oral Minoxidil Pregnancy And Lactation Text: This medication should only be used when clearly needed if you are pregnant, attempting to become pregnant or breast feeding.
Tremfya Counseling: I discussed with the patient the risks of guselkumab including but not limited to immunosuppression, serious infections, worsening of inflammatory bowel disease and drug reactions.  The patient understands that monitoring is required including a PPD at baseline and must alert us or the primary physician if symptoms of infection or other concerning signs are noted.
Cyclophosphamide Counseling:  I discussed with the patient the risks of cyclophosphamide including but not limited to hair loss, hormonal abnormalities, decreased fertility, abdominal pain, diarrhea, nausea and vomiting, bone marrow suppression and infection. The patient understands that monitoring is required while taking this medication.
Xeljanz Counseling: I discussed with the patient the risks of Xeljanz therapy including increased risk of infection, liver issues, headache, diarrhea, or cold symptoms. Live vaccines should be avoided. They were instructed to call if they have any problems.
Bactrim Counseling:  I discussed with the patient the risks of sulfa antibiotics including but not limited to GI upset, allergic reaction, drug rash, diarrhea, dizziness, photosensitivity, and yeast infections.  Rarely, more serious reactions can occur including but not limited to aplastic anemia, agranulocytosis, methemoglobinemia, blood dyscrasias, liver or kidney failure, lung infiltrates or desquamative/blistering drug rashes.
Griseofulvin Counseling:  I discussed with the patient the risks of griseofulvin including but not limited to photosensitivity, cytopenia, liver damage, nausea/vomiting and severe allergy.  The patient understands that this medication is best absorbed when taken with a fatty meal (e.g., ice cream or french fries).
Cibinqo Pregnancy And Lactation Text: It is unknown if this medication will adversely affect pregnancy or breast feeding.  You should not take this medication if you are currently pregnant or planning a pregnancy or while breastfeeding.
Opzelura Pregnancy And Lactation Text: There is insufficient data to evaluate drug-associated risk for major birth defects, miscarriage, or other adverse maternal or fetal outcomes.  There is a pregnancy registry that monitors pregnancy outcomes in pregnant persons exposed to the medication during pregnancy.  It is unknown if this medication is excreted in breast milk.  Do not breastfeed during treatment and for about 4 weeks after the last dose.
High Dose Vitamin A Pregnancy And Lactation Text: High dose vitamin A therapy is contraindicated during pregnancy and breast feeding.
Albendazole Counseling:  I discussed with the patient the risks of albendazole including but not limited to cytopenia, kidney damage, nausea/vomiting and severe allergy.  The patient understands that this medication is being used in an off-label manner.
Opioid Counseling: I discussed with the patient the potential side effects of opioids including but not limited to addiction, altered mental status, and depression. I stressed avoiding alcohol, benzodiazepines, muscle relaxants and sleep aids unless specifically okayed by a physician. The patient verbalized understanding of the proper use and possible adverse effects of opioids. All of the patient's questions and concerns were addressed. They were instructed to flush the remaining pills down the toilet if they did not need them for pain.
Dapsone Pregnancy And Lactation Text: This medication is Pregnancy Category C and is not considered safe during pregnancy or breast feeding.
Hyrimoz Counseling:  I discussed with the patient the risks of adalimumab including but not limited to myelosuppression, immunosuppression, autoimmune hepatitis, demyelinating diseases, lymphoma, and serious infections.  The patient understands that monitoring is required including a PPD at baseline and must alert us or the primary physician if symptoms of infection or other concerning signs are noted.
Low Dose Naltrexone Counseling- I discussed with the patient the potential risks and side effects of low dose naltrexone including but not limited to: more vivid dreams, headaches, nausea, vomiting, abdominal pain, fatigue, dizziness, and anxiety.
Libtayo Pregnancy And Lactation Text: This medication is contraindicated in pregnancy and when breast feeding.
Cimzia Pregnancy And Lactation Text: This medication crosses the placenta but can be considered safe in certain situations. Cimzia may be excreted in breast milk.
Calcipotriene Counseling:  I discussed with the patient the risks of calcipotriene including but not limited to erythema, scaling, itching, and irritation.
Topical Ketoconazole Counseling: Patient counseled that this medication may cause skin irritation or allergic reactions.  In the event of skin irritation, the patient was advised to reduce the amount of the drug applied or use it less frequently.   The patient verbalized understanding of the proper use and possible adverse effects of ketoconazole.  All of the patient's questions and concerns were addressed.
Metronidazole Counseling:  I discussed with the patient the risks of metronidazole including but not limited to seizures, nausea/vomiting, a metallic taste in the mouth, nausea/vomiting and severe allergy.
Terbinafine Counseling: Patient counseling regarding adverse effects of terbinafine including but not limited to headache, diarrhea, rash, upset stomach, liver function test abnormalities, itching, taste/smell disturbance, nausea, abdominal pain, and flatulence.  There is a rare possibility of liver failure that can occur when taking terbinafine.  The patient understands that a baseline LFT and kidney function test may be required. The patient verbalized understanding of the proper use and possible adverse effects of terbinafine.  All of the patient's questions and concerns were addressed.
Birth Control Pills Counseling: Birth Control Pill Counseling: I discussed with the patient the potential side effects of OCPs including but not limited to increased risk of stroke, heart attack, thrombophlebitis, deep venous thrombosis, hepatic adenomas, breast changes, GI upset, headaches, and depression.  The patient verbalized understanding of the proper use and possible adverse effects of OCPs. All of the patient's questions and concerns were addressed.
Otezla Counseling: The side effects of Otezla were discussed with the patient, including but not limited to worsening or new depression, weight loss, diarrhea, nausea, upper respiratory tract infection, and headache. Patient instructed to call the office should any adverse effect occur.  The patient verbalized understanding of the proper use and possible adverse effects of Otezla.  All the patient's questions and concerns were addressed.
Cyclophosphamide Pregnancy And Lactation Text: This medication is Pregnancy Category D and it isn't considered safe during pregnancy. This medication is excreted in breast milk.
Solaraze Pregnancy And Lactation Text: This medication is Pregnancy Category B and is considered safe. There is some data to suggest avoiding during the third trimester. It is unknown if this medication is excreted in breast milk.
Calcipotriene Pregnancy And Lactation Text: The use of this medication during pregnancy or lactation is not recommended as there is insufficient data.
Fluconazole Pregnancy And Lactation Text: This medication is Pregnancy Category C and it isn't know if it is safe during pregnancy. It is also excreted in breast milk.
Winlevi Counseling:  I discussed with the patient the risks of topical clascoterone including but not limited to erythema, scaling, itching, and stinging. Patient voiced their understanding.
Xelsandraz Pregnancy And Lactation Text: This medication is Pregnancy Category D and is not considered safe during pregnancy.  The risk during breast feeding is also uncertain.
Bactrim Pregnancy And Lactation Text: This medication is Pregnancy Category D and is known to cause fetal risk.  It is also excreted in breast milk.
Simponi Counseling:  I discussed with the patient the risks of golimumab including but not limited to myelosuppression, immunosuppression, autoimmune hepatitis, demyelinating diseases, lymphoma, and serious infections.  The patient understands that monitoring is required including a PPD at baseline and must alert us or the primary physician if symptoms of infection or other concerning signs are noted.
Picato Counseling:  I discussed with the patient the risks of Picato including but not limited to erythema, scaling, itching, weeping, crusting, and pain.
Albendazole Pregnancy And Lactation Text: This medication is Pregnancy Category C and it isn't known if it is safe during pregnancy. It is also excreted in breast milk.
Opioid Pregnancy And Lactation Text: These medications can lead to premature delivery and should be avoided during pregnancy. These medications are also present in breast milk in small amounts.
Litfulo Counseling: I discussed with the patient the risks of Litfulo therapy including but not limited to upper respiratory tract infections, shingles, cold sores, and nausea. Live vaccines should be avoided.  This medication has been linked to serious infections; higher rate of mortality; malignancy and lymphoproliferative disorders; major adverse cardiovascular events; thrombosis; gastrointestinal perforations; neutropenia; lymphopenia; anemia; liver enzyme elevations; and lipid elevations.
Gabapentin Counseling: I discussed with the patient the risks of gabapentin including but not limited to dizziness, somnolence, fatigue and ataxia.
Cantharidin Counseling:  I discussed with the patient the risks of Cantharidin including but not limited to pain, redness, burning, itching, and blistering.
Low Dose Naltrexone Pregnancy And Lactation Text: Naltrexone is pregnancy category C.  There have been no adequate and well-controlled studies in pregnant women.  It should be used in pregnancy only if the potential benefit justifies the potential risk to the fetus.   Limited data indicates that naltrexone is minimally excreted into breastmilk.
Odomzo Counseling- I discussed with the patient the risks of Odomzo including but not limited to nausea, vomiting, diarrhea, constipation, weight loss, changes in the sense of taste, decreased appetite, muscle spasms, and hair loss.  The patient verbalized understanding of the proper use and possible adverse effects of Odomzo.  All of the patient's questions and concerns were addressed.
Tranexamic Acid Counseling:  Patient advised of the small risk of bleeding problems with tranexamic acid. They were also instructed to call if they developed any nausea, vomiting or diarrhea. All of the patient's questions and concerns were addressed.
Klisyri Counseling:  I discussed with the patient the risks of Klisyri including but not limited to erythema, scaling, itching, weeping, crusting, and pain.
Terbinafine Pregnancy And Lactation Text: This medication is Pregnancy Category B and is considered safe during pregnancy. It is also excreted in breast milk and breast feeding isn't recommended.
Cosentyx Counseling:  I discussed with the patient the risks of Cosentyx including but not limited to worsening of Crohn's disease, immunosuppression, allergic reactions and infections.  The patient understands that monitoring is required including a PPD at baseline and must alert us or the primary physician if symptoms of infection or other concerning signs are noted.
Birth Control Pills Pregnancy And Lactation Text: This medication should be avoided if pregnant and for the first 30 days post-partum.
Aklief counseling:  Patient advised to apply a pea-sized amount only at bedtime and wait 30 minutes after washing their face before applying.  If too drying, patient may add a non-comedogenic moisturizer.  The most commonly reported side effects including irritation, redness, scaling, dryness, stinging, burning, itching, and increased risk of sunburn.  The patient verbalized understanding of the proper use and possible adverse effects of retinoids.  All of the patient's questions and concerns were addressed.
Metronidazole Pregnancy And Lactation Text: This medication is Pregnancy Category B and considered safe during pregnancy.  It is also excreted in breast milk.
Soolantra Counseling: I discussed with the patients the risks of topial Soolantra. This is a medicine which decreases the number of mites and inflammation in the skin. You experience burning, stinging, eye irritation or allergic reactions.  Please call our office if you develop any problems from using this medication.
Xolair Counseling:  Patient informed of potential adverse effects including but not limited to fever, muscle aches, rash and allergic reactions.  The patient verbalized understanding of the proper use and possible adverse effects of Xolair.  All of the patient's questions and concerns were addressed.
Otezla Pregnancy And Lactation Text: This medication is Pregnancy Category C and it isn't known if it is safe during pregnancy. It is unknown if it is excreted in breast milk.
Cantharidin Pregnancy And Lactation Text: This medication has not been proven safe during pregnancy. It is unknown if this medication is excreted in breast milk.
Fluconazole Counseling:  Patient counseled regarding adverse effects of fluconazole including but not limited to headache, diarrhea, nausea, upset stomach, liver function test abnormalities, taste disturbance, and stomach pain.  There is a rare possibility of liver failure that can occur when taking fluconazole.  The patient understands that monitoring of LFTs and kidney function test may be required, especially at baseline. The patient verbalized understanding of the proper use and possible adverse effects of fluconazole.  All of the patient's questions and concerns were addressed.
Winlevi Pregnancy And Lactation Text: This medication is considered safe during pregnancy and breastfeeding.
Cephalexin Counseling: I counseled the patient regarding use of cephalexin as an antibiotic for prophylactic and/or therapeutic purposes. Cephalexin (commonly prescribed under brand name Keflex) is a cephalosporin antibiotic which is active against numerous classes of bacteria, including most skin bacteria. Side effects may include nausea, diarrhea, gastrointestinal upset, rash, hives, yeast infections, and in rare cases, hepatitis, kidney disease, seizures, fever, confusion, neurologic symptoms, and others. Patients with severe allergies to penicillin medications are cautioned that there is about a 10% incidence of cross-reactivity with cephalosporins. When possible, patients with penicillin allergies should use alternatives to cephalosporins for antibiotic therapy.
Cyclosporine Counseling:  I discussed with the patient the risks of cyclosporine including but not limited to hypertension, gingival hyperplasia,myelosuppression, immunosuppression, liver damage, kidney damage, neurotoxicity, lymphoma, and serious infections. The patient understands that monitoring is required including baseline blood pressure, CBC, CMP, lipid panel and uric acid, and then 1-2 times monthly CMP and blood pressure.
Niacinamide Counseling: I recommended taking niacin or niacinamide, also know as vitamin B3, twice daily. Recent evidence suggests that taking vitamin B3 (500 mg twice daily) can reduce the risk of actinic keratoses and non-melanoma skin cancers. Side effects of vitamin B3 include flushing and headache.
Litfulo Pregnancy And Lactation Text: Based on animal studies, Lifulo may cause embryo-fetal harm when administered to pregnant women.  The medication should not be used in pregnancy.  Breastfeeding is not recommended during treatment.
Ivermectin Counseling:  Patient instructed to take medication on an empty stomach with a full glass of water.  Patient informed of potential adverse effects including but not limited to nausea, diarrhea, dizziness, itching, and swelling of the extremities or lymph nodes.  The patient verbalized understanding of the proper use and possible adverse effects of ivermectin.  All of the patient's questions and concerns were addressed.
5-Fu Counseling: 5-Fluorouracil Counseling:  I discussed with the patient the risks of 5-fluorouracil including but not limited to erythema, scaling, itching, weeping, crusting, and pain.
Topical Metronidazole Counseling: Metronidazole is a topical antibiotic medication. You may experience burning, stinging, redness, or allergic reactions.  Please call our office if you develop any problems from using this medication.
Ilumya Counseling: I discussed with the patient the risks of tildrakizumab including but not limited to immunosuppression, malignancy, posterior leukoencephalopathy syndrome, and serious infections.  The patient understands that monitoring is required including a PPD at baseline and must alert us or the primary physician if symptoms of infection or other concerning signs are noted.
Acitretin Counseling:  I discussed with the patient the risks of acitretin including but not limited to hair loss, dry lips/skin/eyes, liver damage, hyperlipidemia, depression/suicidal ideation, photosensitivity.  Serious rare side effects can include but are not limited to pancreatitis, pseudotumor cerebri, bony changes, clot formation/stroke/heart attack.  Patient understands that alcohol is contraindicated since it can result in liver toxicity and significantly prolong the elimination of the drug by many years.
Arava Counseling:  Patient counseled regarding adverse effects of Arava including but not limited to nausea, vomiting, abnormalities in liver function tests. Patients may develop mouth sores, rash, diarrhea, and abnormalities in blood counts. The patient understands that monitoring is required including LFTs and blood counts.  There is a rare possibility of scarring of the liver and lung problems that can occur when taking methotrexate. Persistent nausea, loss of appetite, pale stools, dark urine, cough, and shortness of breath should be reported immediately. Patient advised to discontinue Arava treatment and consult with a physician prior to attempting conception. The patient will have to undergo a treatment to eliminate Arava from the body prior to conception.
Tranexamic Acid Pregnancy And Lactation Text: It is unknown if this medication is safe during pregnancy or breast feeding.
Spironolactone Counseling: Patient advised regarding risks of diarrhea, abdominal pain, hyperkalemia, birth defects (for female patients), liver toxicity and renal toxicity. The patient may need blood work to monitor liver and kidney function and potassium levels while on therapy. The patient verbalized understanding of the proper use and possible adverse effects of spironolactone.  All of the patient's questions and concerns were addressed.
Aklief Pregnancy And Lactation Text: It is unknown if this medication is safe to use during pregnancy.  It is unknown if this medication is excreted in breast milk.  Breastfeeding women should use the topical cream on the smallest area of the skin for the shortest time needed while breastfeeding.  Do not apply to nipple and areola.
Xolair Pregnancy And Lactation Text: This medication is Pregnancy Category B and is considered safe during pregnancy. This medication is excreted in breast milk.
Minocycline Counseling: Patient advised regarding possible photosensitivity and discoloration of the teeth, skin, lips, tongue and gums.  Patient instructed to avoid sunlight, if possible.  When exposed to sunlight, patients should wear protective clothing, sunglasses, and sunscreen.  The patient was instructed to call the office immediately if the following severe adverse effects occur:  hearing changes, easy bruising/bleeding, severe headache, or vision changes.  The patient verbalized understanding of the proper use and possible adverse effects of minocycline.  All of the patient's questions and concerns were addressed.
Oxybutynin Counseling:  I discussed with the patient the risks of oxybutynin including but not limited to skin rash, drowsiness, dry mouth, difficulty urinating, and blurred vision.
Soolantra Pregnancy And Lactation Text: This medication is Pregnancy Category C. This medication is considered safe during breast feeding.
VTAMA Counseling: I discussed with the patient that VTAMA is not for use in the eyes, mouth or mouth. They should call the office if they develop any signs of allergic reactions to VTAMA. The patient verbalized understanding of the proper use and possible adverse effects of VTAMA.  All of the patient's questions and concerns were addressed.
Cimetidine Counseling:  I discussed with the patient the risks of Cimetidine including but not limited to gynecomastia, headache, diarrhea, nausea, drowsiness, arrhythmias, pancreatitis, skin rashes, psychosis, bone marrow suppression and kidney toxicity.
Niacinamide Pregnancy And Lactation Text: These medications are considered safe during pregnancy.
Skyrizi Counseling: I discussed with the patient the risks of risankizumab-rzaa including but not limited to immunosuppression, and serious infections.  The patient understands that monitoring is required including a PPD at baseline and must alert us or the primary physician if symptoms of infection or other concerning signs are noted.
Protopic Counseling: Patient may experience a mild burning sensation during topical application. Protopic is not approved in children less than 2 years of age. There have been case reports of hematologic and skin malignancies in patients using topical calcineurin inhibitors although causality is questionable.
Cephalexin Pregnancy And Lactation Text: This medication is Pregnancy Category B and considered safe during pregnancy.  It is also excreted in breast milk but can be used safely for shorter doses.
Detail Level: Zone
Olumiant Counseling: I discussed with the patient the risks of Olumiant therapy including but not limited to upper respiratory tract infections, shingles, cold sores, and nausea. Live vaccines should be avoided.  This medication has been linked to serious infections; higher rate of mortality; malignancy and lymphoproliferative disorders; major adverse cardiovascular events; thrombosis; gastrointestinal perforations; neutropenia; lymphopenia; anemia; liver enzyme elevations; and lipid elevations.
Imiquimod Counseling:  I discussed with the patient the risks of imiquimod including but not limited to erythema, scaling, itching, weeping, crusting, and pain.  Patient understands that the inflammatory response to imiquimod is variable from person to person and was educated regarded proper titration schedule.  If flu-like symptoms develop, patient knows to discontinue the medication and contact us.
Topical Metronidazole Pregnancy And Lactation Text: This medication is Pregnancy Category B and considered safe during pregnancy.  It is also considered safe to use while breastfeeding.
Dupixent Counseling: I discussed with the patient the risks of dupilumab including but not limited to eye infection and irritation, cold sores, injection site reactions, worsening of asthma, allergic reactions and increased risk of parasitic infection.  Live vaccines should be avoided while taking dupilumab. Dupilumab will also interact with certain medications such as warfarin and cyclosporine. The patient understands that monitoring is required and they must alert us or the primary physician if symptoms of infection or other concerning signs are noted.
Azelaic Acid Counseling: Patient counseled that medicine may cause skin irritation and to avoid applying near the eyes.  In the event of skin irritation, the patient was advised to reduce the amount of the drug applied or use it less frequently.   The patient verbalized understanding of the proper use and possible adverse effects of azelaic acid.  All of the patient's questions and concerns were addressed.
Valtrex Counseling: I discussed with the patient the risks of valacyclovir including but not limited to kidney damage, nausea, vomiting and severe allergy.  The patient understands that if the infection seems to be worsening or is not improving, they are to call.
Klisyri Pregnancy And Lactation Text: It is unknown if this medication can harm a developing fetus or if it is excreted in breast milk.
Spironolactone Pregnancy And Lactation Text: This medication can cause feminization of the male fetus and should be avoided during pregnancy. The active metabolite is also found in breast milk.
Acitretin Pregnancy And Lactation Text: This medication is Pregnancy Category X and should not be given to women who are pregnant or may become pregnant in the future. This medication is excreted in breast milk.
Topical Retinoid counseling:  Patient advised to apply a pea-sized amount only at bedtime and wait 30 minutes after washing their face before applying.  If too drying, patient may add a non-comedogenic moisturizer. The patient verbalized understanding of the proper use and possible adverse effects of retinoids.  All of the patient's questions and concerns were addressed.
Minocycline Pregnancy And Lactation Text: This medication is Pregnancy Category D and not consider safe during pregnancy. It is also excreted in breast milk.
Vtama Pregnancy And Lactation Text: It is unknown if this medication can cause problems during pregnancy and breastfeeding.
Methotrexate Counseling:  Patient counseled regarding adverse effects of methotrexate including but not limited to nausea, vomiting, abnormalities in liver function tests. Patients may develop mouth sores, rash, diarrhea, and abnormalities in blood counts. The patient understands that monitoring is required including LFT's and blood counts.  There is a rare possibility of scarring of the liver and lung problems that can occur when taking methotrexate. Persistent nausea, loss of appetite, pale stools, dark urine, cough, and shortness of breath should be reported immediately. Patient advised to discontinue methotrexate treatment at least three months before attempting to become pregnant.  I discussed the need for folate supplements while taking methotrexate.  These supplements can decrease side effects during methotrexate treatment. The patient verbalized understanding of the proper use and possible adverse effects of methotrexate.  All of the patient's questions and concerns were addressed.
Clindamycin Counseling: I counseled the patient regarding use of clindamycin as an antibiotic for prophylactic and/or therapeutic purposes. Clindamycin is active against numerous classes of bacteria, including skin bacteria. Side effects may include nausea, diarrhea, gastrointestinal upset, rash, hives, yeast infections, and in rare cases, colitis.
Dutasteride Male Counseling: Dustasteride Counseling:  I discussed with the patient the risks of use of dutasteride including but not limited to decreased libido, decreased ejaculate volume, and gynecomastia. Women who can become pregnant should not handle medication.  All of the patient's questions and concerns were addressed.
Drysol Counseling:  I discussed with the patient the risks of drysol/aluminum chloride including but not limited to skin rash, itching, irritation, burning.
Olumiant Pregnancy And Lactation Text: Based on animal studies, Olumiant may cause embryo-fetal harm when administered to pregnant women.  The medication should not be used in pregnancy.  Breastfeeding is not recommended during treatment.
Protopic Pregnancy And Lactation Text: This medication is Pregnancy Category C. It is unknown if this medication is excreted in breast milk when applied topically.
Nsaids Counseling: NSAID Counseling: I discussed with the patient that NSAIDs should be taken with food. Prolonged use of NSAIDs can result in the development of stomach ulcers.  Patient advised to stop taking NSAIDs if abdominal pain occurs.  The patient verbalized understanding of the proper use and possible adverse effects of NSAIDs.  All of the patient's questions and concerns were addressed.
Topical Steroids Counseling: I discussed with the patient that prolonged use of topical steroids can result in the increased appearance of superficial blood vessels (telangiectasias), lightening (hypopigmentation) and thinning of the skin (atrophy).  Patient understands to avoid using high potency steroids in skin folds, the groin or the face.  The patient verbalized understanding of the proper use and possible adverse effects of topical steroids.  All of the patient's questions and concerns were addressed.
Hydroquinone Pregnancy And Lactation Text: This medication has not been assigned a Pregnancy Risk Category but animal studies failed to show danger with the topical medication. It is unknown if the medication is excreted in breast milk.
Include Pregnancy/Lactation Warning?: No
Infliximab Counseling:  I discussed with the patient the risks of infliximab including but not limited to myelosuppression, immunosuppression, autoimmune hepatitis, demyelinating diseases, lymphoma, and serious infections.  The patient understands that monitoring is required including a PPD at baseline and must alert us or the primary physician if symptoms of infection or other concerning signs are noted.
Erivedge Counseling- I discussed with the patient the risks of Erivedge including but not limited to nausea, vomiting, diarrhea, constipation, weight loss, changes in the sense of taste, decreased appetite, muscle spasms, and hair loss.  The patient verbalized understanding of the proper use and possible adverse effects of Erivedge.  All of the patient's questions and concerns were addressed.
Clofazimine Counseling:  I discussed with the patient the risks of clofazimine including but not limited to skin and eye pigmentation, liver damage, nausea/vomiting, gastrointestinal bleeding and allergy.
Valtrex Pregnancy And Lactation Text: this medication is Pregnancy Category B and is considered safe during pregnancy. This medication is not directly found in breast milk but it's metabolite acyclovir is present.
Bexarotene Counseling:  I discussed with the patient the risks of bexarotene including but not limited to hair loss, dry lips/skin/eyes, liver abnormalities, hyperlipidemia, pancreatitis, depression/suicidal ideation, photosensitivity, drug rash/allergic reactions, hypothyroidism, anemia, leukopenia, infection, cataracts, and teratogenicity.  Patient understands that they will need regular blood tests to check lipid profile, liver function tests, white blood cell count, thyroid function tests and pregnancy test if applicable.
Dupixent Pregnancy And Lactation Text: This medication likely crosses the placenta but the risk for the fetus is uncertain. This medication is excreted in breast milk.
Quinolones Counseling:  I discussed with the patient the risks of fluoroquinolones including but not limited to GI upset, allergic reaction, drug rash, diarrhea, dizziness, photosensitivity, yeast infections, liver function test abnormalities, tendonitis/tendon rupture.
Minoxidil Counseling: Minoxidil is a topical medication which can increase blood flow where it is applied. It is uncertain how this medication increases hair growth. Side effects are uncommon and include stinging and allergic reactions.
Azelaic Acid Pregnancy And Lactation Text: This medication is considered safe during pregnancy and breast feeding.
Adbry Counseling: I discussed with the patient the risks of tralokinumab including but not limited to eye infection and irritation, cold sores, injection site reactions, worsening of asthma, allergic reactions and increased risk of parasitic infection.  Live vaccines should be avoided while taking tralokinumab. The patient understands that monitoring is required and they must alert us or the primary physician if symptoms of infection or other concerning signs are noted.
Tetracycline Counseling: Patient counseled regarding possible photosensitivity and increased risk for sunburn.  Patient instructed to avoid sunlight, if possible.  When exposed to sunlight, patients should wear protective clothing, sunglasses, and sunscreen.  The patient was instructed to call the office immediately if the following severe adverse effects occur:  hearing changes, easy bruising/bleeding, severe headache, or vision changes.  The patient verbalized understanding of the proper use and possible adverse effects of tetracycline.  All of the patient's questions and concerns were addressed. Patient understands to avoid pregnancy while on therapy due to potential birth defects.
Zoryve Counseling:  I discussed with the patient that Zoryve is not for use in the eyes, mouth or vagina. The most commonly reported side effects include diarrhea, headache, insomnia, application site pain, upper respiratory tract infections, and urinary tract infections.  All of the patient's questions and concerns were addressed.
Propranolol Counseling:  I discussed with the patient the risks of propranolol including but not limited to low heart rate, low blood pressure, low blood sugar, restlessness and increased cold sensitivity. They should call the office if they experience any of these side effects.
Methotrexate Pregnancy And Lactation Text: This medication is Pregnancy Category X and is known to cause fetal harm. This medication is excreted in breast milk.
Clindamycin Pregnancy And Lactation Text: This medication can be used in pregnancy if certain situations. Clindamycin is also present in breast milk.
Dutasteride Female Counseling: Dutasteride Counseling:  I discussed with the patient the risks of use of dutasteride including but not limited to decreased libido and sexual dysfunction. Explained the teratogenic nature of the medication and stressed the importance of not getting pregnant during treatment. All of the patient's questions and concerns were addressed.
Stelara Counseling:  I discussed with the patient the risks of ustekinumab including but not limited to immunosuppression, malignancy, posterior leukoencephalopathy syndrome, and serious infections.  The patient understands that monitoring is required including a PPD at baseline and must alert us or the primary physician if symptoms of infection or other concerning signs are noted.
Azathioprine Counseling:  I discussed with the patient the risks of azathioprine including but not limited to myelosuppression, immunosuppression, hepatotoxicity, lymphoma, and infections.  The patient understands that monitoring is required including baseline LFTs, Creatinine, possible TPMP genotyping and weekly CBCs for the first month and then every 2 weeks thereafter.  The patient verbalized understanding of the proper use and possible adverse effects of azathioprine.  All of the patient's questions and concerns were addressed.
Doxepin Counseling:  Patient advised that the medication is sedating and not to drive a car after taking this medication. Patient informed of potential adverse effects including but not limited to dry mouth, urinary retention, and blurry vision.  The patient verbalized understanding of the proper use and possible adverse effects of doxepin.  All of the patient's questions and concerns were addressed.
Qbrexza Counseling:  I discussed with the patient the risks of Qbrexza including but not limited to headache, mydriasis, blurred vision, dry eyes, nasal dryness, dry mouth, dry throat, dry skin, urinary hesitation, and constipation.  Local skin reactions including erythema, burning, stinging, and itching can also occur.
Rinvoq Counseling: I discussed with the patient the risks of Rinvoq therapy including but not limited to upper respiratory tract infections, shingles, cold sores, bronchitis, nausea, cough, fever, acne, and headache. Live vaccines should be avoided.  This medication has been linked to serious infections; higher rate of mortality; malignancy and lymphoproliferative disorders; major adverse cardiovascular events; thrombosis; thrombocytopenia, anemia, and neutropenia; lipid elevations; liver enzyme elevations; and gastrointestinal perforations.
Hydroquinone Counseling:  Patient advised that medication may result in skin irritation, lightening (hypopigmentation), dryness, and burning.  In the event of skin irritation, the patient was advised to reduce the amount of the drug applied or use it less frequently.  Rarely, spots that are treated with hydroquinone can become darker (pseudoochronosis).  Should this occur, patient instructed to stop medication and call the office. The patient verbalized understanding of the proper use and possible adverse effects of hydroquinone.  All of the patient's questions and concerns were addressed.
Topical Steroids Applications Pregnancy And Lactation Text: Most topical steroids are considered safe to use during pregnancy and lactation.  Any topical steroid applied to the breast or nipple should be washed off before breastfeeding.
Nsaids Pregnancy And Lactation Text: These medications are considered safe up to 30 weeks gestation. It is excreted in breast milk.
Bexarotene Pregnancy And Lactation Text: This medication is Pregnancy Category X and should not be given to women who are pregnant or may become pregnant. This medication should not be used if you are breast feeding.
Enbrel Counseling:  I discussed with the patient the risks of etanercept including but not limited to myelosuppression, immunosuppression, autoimmune hepatitis, demyelinating diseases, lymphoma, and infections.  The patient understands that monitoring is required including a PPD at baseline and must alert us or the primary physician if symptoms of infection or other concerning signs are noted.
Glycopyrrolate Counseling:  I discussed with the patient the risks of glycopyrrolate including but not limited to skin rash, drowsiness, dry mouth, difficulty urinating, and blurred vision.
Benzoyl Peroxide Counseling: Patient counseled that medicine may cause skin irritation and bleach clothing.  In the event of skin irritation, the patient was advised to reduce the amount of the drug applied or use it less frequently.   The patient verbalized understanding of the proper use and possible adverse effects of benzoyl peroxide.  All of the patient's questions and concerns were addressed.
Adbry Pregnancy And Lactation Text: It is unknown if this medication will adversely affect pregnancy or breast feeding.
Tazorac Counseling:  Patient advised that medication is irritating and drying.  Patient may need to apply sparingly and wash off after an hour before eventually leaving it on overnight.  The patient verbalized understanding of the proper use and possible adverse effects of tazorac.  All of the patient's questions and concerns were addressed.
Propranolol Pregnancy And Lactation Text: This medication is Pregnancy Category C and it isn't known if it is safe during pregnancy. It is excreted in breast milk.
Prednisone Counseling:  I discussed with the patient the risks of prolonged use of prednisone including but not limited to weight gain, insomnia, osteoporosis, mood changes, diabetes, susceptibility to infection, glaucoma and high blood pressure.  In cases where prednisone use is prolonged, patients should be monitored with blood pressure checks, serum glucose levels and an eye exam.  Additionally, the patient may need to be placed on GI prophylaxis, PCP prophylaxis, and calcium and vitamin D supplementation and/or a bisphosphonate.  The patient verbalized understanding of the proper use and the possible adverse effects of prednisone.  All of the patient's questions and concerns were addressed.
Dutasteride Pregnancy And Lactation Text: This medication is absolutely contraindicated in women, especially during pregnancy and breast feeding. Feminization of male fetuses is possible if taking while pregnant.
Elidel Counseling: Patient may experience a mild burning sensation during topical application. Elidel is not approved in children less than 2 years of age. There have been case reports of hematologic and skin malignancies in patients using topical calcineurin inhibitors although causality is questionable.
Doxepin Pregnancy And Lactation Text: This medication is Pregnancy Category C and it isn't known if it is safe during pregnancy. It is also excreted in breast milk and breast feeding isn't recommended.
Doxycycline Counseling:  Patient counseled regarding possible photosensitivity and increased risk for sunburn.  Patient instructed to avoid sunlight, if possible.  When exposed to sunlight, patients should wear protective clothing, sunglasses, and sunscreen.  The patient was instructed to call the office immediately if the following severe adverse effects occur:  hearing changes, easy bruising/bleeding, severe headache, or vision changes.  The patient verbalized understanding of the proper use and possible adverse effects of doxycycline.  All of the patient's questions and concerns were addressed.
Qbrexza Pregnancy And Lactation Text: There is no available data on Qbrexza use in pregnant women.  There is no available data on Qbrexza use in lactation.
Topical Sulfur Applications Counseling: Topical Sulfur Counseling: Patient counseled that this medication may cause skin irritation or allergic reactions.  In the event of skin irritation, the patient was advised to reduce the amount of the drug applied or use it less frequently.   The patient verbalized understanding of the proper use and possible adverse effects of topical sulfur application.  All of the patient's questions and concerns were addressed.
Olanzapine Counseling- I discussed with the patient the common side effects of olanzapine including but are not limited to: lack of energy, dry mouth, increased appetite, sleepiness, tremor, constipation, dizziness, changes in behavior, or restlessness.  Explained that teenagers are more likely to experience headaches, abdominal pain, pain in the arms or legs, tiredness, and sleepiness.  Serious side effects include but are not limited: increased risk of death in elderly patients who are confused, have memory loss, or dementia-related psychosis; hyperglycemia; increased cholesterol and triglycerides; and weight gain.
Rinvoq Pregnancy And Lactation Text: Based on animal studies, Rinvoq may cause embryo-fetal harm when administered to pregnant women.  The medication should not be used in pregnancy.  Breastfeeding is not recommended during treatment and for 6 days after the last dose.
Benzoyl Peroxide Pregnancy And Lactation Text: This medication is Pregnancy Category C. It is unknown if benzoyl peroxide is excreted in breast milk.
Rifampin Counseling: I discussed with the patient the risks of rifampin including but not limited to liver damage, kidney damage, red-orange body fluids, nausea/vomiting and severe allergy.
Rituxan Counseling:  I discussed with the patient the risks of Rituxan infusions. Side effects can include infusion reactions, severe drug rashes including mucocutaneous reactions, reactivation of latent hepatitis and other infections and rarely progressive multifocal leukoencephalopathy.  All of the patient's questions and concerns were addressed.
Mirvaso Counseling: Mirvaso is a topical medication which can decrease superficial blood flow where applied. Side effects are uncommon and include stinging, redness and allergic reactions.
Isotretinoin Counseling: Patient should get monthly blood tests, not donate blood, not drive at night if vision affected, not share medication, and not undergo elective surgery for 6 months after tx completed. Side effects reviewed, pt to contact office should one occur.

## 2024-04-01 NOTE — PROCEDURE: ORDER TESTS
Bill For Surgical Tray: no
Performing Laboratory: 0
Expected Date Of Service: 04/01/2024
Billing Type: Third-Party Bill

## 2024-05-07 RX ORDER — TERBINAFINE HYDROCHLORIDE 250 MG/1
ONE TABLET ORAL QD
Qty: 30 | Refills: 0 | Status: CANCELLED

## 2024-05-07 RX ORDER — MINOCYCLINE HYDROCHLORIDE 100 MG/1
1 CAPSULE ORAL QD
Qty: 30 | Refills: 3 | Status: ERX

## 2024-08-19 ENCOUNTER — APPOINTMENT (RX ONLY)
Dept: URBAN - METROPOLITAN AREA CLINIC 51 | Facility: CLINIC | Age: 59
Setting detail: DERMATOLOGY
End: 2024-08-19

## 2024-08-19 DIAGNOSIS — L82.1 OTHER SEBORRHEIC KERATOSIS: ICD-10-CM | Status: INADEQUATELY CONTROLLED

## 2024-08-19 PROCEDURE — 17111 DESTRUCTION B9 LESIONS 15/>: CPT

## 2024-08-19 PROCEDURE — ? LIQUID NITROGEN

## 2024-08-19 PROCEDURE — ? COUNSELING

## 2024-08-19 ASSESSMENT — LOCATION DETAILED DESCRIPTION DERM
LOCATION DETAILED: SUBXIPHOID
LOCATION DETAILED: RIGHT INFERIOR MEDIAL LOWER BACK
LOCATION DETAILED: RIGHT INFERIOR UPPER BACK
LOCATION DETAILED: RIGHT BUTTOCK
LOCATION DETAILED: RIGHT MEDIAL BREAST 2-3:00 REGION
LOCATION DETAILED: LEFT INFERIOR LATERAL UPPER BACK
LOCATION DETAILED: RIGHT LATERAL BREAST 6-7:00 REGION
LOCATION DETAILED: EPIGASTRIC SKIN
LOCATION DETAILED: RIGHT INFERIOR LATERAL UPPER BACK
LOCATION DETAILED: RIGHT RIB CAGE
LOCATION DETAILED: LEFT MEDIAL BREAST 6-7:00 REGION
LOCATION DETAILED: RIGHT MEDIAL BREAST 3-4:00 REGION
LOCATION DETAILED: RIGHT INFRAMAMMARY CREASE (OUTER QUADRANT)
LOCATION DETAILED: LEFT LATERAL BREAST 5-6:00 REGION
LOCATION DETAILED: LEFT RIB CAGE
LOCATION DETAILED: RIGHT LATERAL UPPER BACK

## 2024-08-19 ASSESSMENT — LOCATION SIMPLE DESCRIPTION DERM
LOCATION SIMPLE: RIGHT BREAST
LOCATION SIMPLE: RIGHT LOWER BACK
LOCATION SIMPLE: LEFT BREAST
LOCATION SIMPLE: RIGHT BUTTOCK
LOCATION SIMPLE: ABDOMEN
LOCATION SIMPLE: RIGHT UPPER BACK
LOCATION SIMPLE: LEFT UPPER BACK

## 2024-08-19 ASSESSMENT — PAIN INTENSITY VAS: HOW INTENSE IS YOUR PAIN 0 BEING NO PAIN, 10 BEING THE MOST SEVERE PAIN POSSIBLE?: NO PAIN

## 2024-08-19 ASSESSMENT — LOCATION ZONE DERM: LOCATION ZONE: TRUNK

## 2024-08-19 NOTE — PROCEDURE: LIQUID NITROGEN
Consent: The patient's consent was obtained including but not limited to risks of crusting, scabbing, blistering, scarring, darker or lighter pigmentary change, recurrence, incomplete removal and infection.
Medical Necessity Information: It is in your best interest to select a reason for this procedure from the list below. All of these items fulfill various CMS LCD requirements except the new and changing color options.
Spray Paint Text: The liquid nitrogen was applied to the skin utilizing a spray paint frosting technique.
Render Note In Bullet Format When Appropriate: No
Bill Insurance (You Assume Risk Of Denial Or Audit By Selecting Yes): Yes
Detail Level: Detailed
Duration Of Freeze Thaw-Cycle (Seconds): 0
Medical Necessity Clause: This procedure was medically necessary because the lesions that were treated were:
Post-Care Instructions: I reviewed with the patient in detail post-care instructions. Patient is to wear sunprotection, and avoid picking at any of the treated lesions. Pt may apply Vaseline to crusted or scabbing areas.

## 2024-10-07 RX ORDER — MINOCYCLINE HYDROCHLORIDE 100 MG/1
1 CAPSULE ORAL QD
Qty: 30 | Refills: 3 | Status: ERX

## 2025-02-10 ENCOUNTER — APPOINTMENT (OUTPATIENT)
Age: 60
Setting detail: DERMATOLOGY
End: 2025-02-10

## 2025-02-10 DIAGNOSIS — L82.1 OTHER SEBORRHEIC KERATOSIS: ICD-10-CM | Status: INADEQUATELY CONTROLLED

## 2025-02-10 PROCEDURE — 17110 DESTRUCTION B9 LES UP TO 14: CPT

## 2025-02-10 PROCEDURE — ? COUNSELING

## 2025-02-10 PROCEDURE — ? LIQUID NITROGEN

## 2025-02-10 ASSESSMENT — LOCATION DETAILED DESCRIPTION DERM
LOCATION DETAILED: RIGHT CENTRAL TEMPLE
LOCATION DETAILED: RIGHT SUPERIOR MEDIAL FOREHEAD

## 2025-02-10 ASSESSMENT — LOCATION SIMPLE DESCRIPTION DERM
LOCATION SIMPLE: RIGHT FOREHEAD
LOCATION SIMPLE: RIGHT TEMPLE

## 2025-02-10 ASSESSMENT — PAIN INTENSITY VAS: HOW INTENSE IS YOUR PAIN 0 BEING NO PAIN, 10 BEING THE MOST SEVERE PAIN POSSIBLE?: NO PAIN

## 2025-02-10 ASSESSMENT — LOCATION ZONE DERM: LOCATION ZONE: FACE

## 2025-02-10 NOTE — PROCEDURE: LIQUID NITROGEN
Post-Care Instructions: I reviewed with the patient in detail post-care instructions. Patient is to wear sunprotection, and avoid picking at any of the treated lesions. Pt may apply Vaseline to crusted or scabbing areas.
Spray Paint Technique: No
Medical Necessity Information: It is in your best interest to select a reason for this procedure from the list below. All of these items fulfill various CMS LCD requirements except the new and changing color options.
Duration Of Freeze Thaw-Cycle (Seconds): 0
Medical Necessity Clause: This procedure was medically necessary because the lesions that were treated were:
Spray Paint Text: The liquid nitrogen was applied to the skin utilizing a spray paint frosting technique.
Show Spray Paint Technique Variable?: Yes
Detail Level: Detailed
Consent: The patient's consent was obtained including but not limited to risks of crusting, scabbing, blistering, scarring, darker or lighter pigmentary change, recurrence, incomplete removal and infection.

## 2025-03-05 RX ORDER — MINOCYCLINE HYDROCHLORIDE 100 MG/1
1 CAPSULE ORAL QD
Qty: 30 | Refills: 3 | Status: ERX

## 2025-03-24 ENCOUNTER — APPOINTMENT (OUTPATIENT)
Age: 60
Setting detail: DERMATOLOGY
End: 2025-03-24

## 2025-03-24 DIAGNOSIS — L82.1 OTHER SEBORRHEIC KERATOSIS: ICD-10-CM | Status: INADEQUATELY CONTROLLED

## 2025-03-24 PROCEDURE — ? COUNSELING

## 2025-03-24 PROCEDURE — ? LIQUID NITROGEN

## 2025-03-24 PROCEDURE — 17111 DESTRUCTION B9 LESIONS 15/>: CPT

## 2025-03-24 ASSESSMENT — LOCATION DETAILED DESCRIPTION DERM
LOCATION DETAILED: SUPERIOR LUMBAR SPINE
LOCATION DETAILED: LEFT INFRAMAMMARY CREASE (OUTER QUADRANT)
LOCATION DETAILED: LEFT LATERAL ABDOMEN
LOCATION DETAILED: RIGHT RIB CAGE
LOCATION DETAILED: LEFT SUPERIOR LATERAL MIDBACK
LOCATION DETAILED: RIGHT SUPERIOR LATERAL MIDBACK
LOCATION DETAILED: RIGHT LATERAL ABDOMEN
LOCATION DETAILED: LEFT SUPERIOR FLANK
LOCATION DETAILED: RIGHT INFERIOR UPPER BACK
LOCATION DETAILED: LEFT INFERIOR MEDIAL MIDBACK
LOCATION DETAILED: LEFT INFERIOR UPPER BACK
LOCATION DETAILED: RIGHT INFRAMAMMARY CREASE (OUTER QUADRANT)
LOCATION DETAILED: LEFT INFERIOR MEDIAL UPPER BACK
LOCATION DETAILED: RIGHT LATERAL BREAST 8-9:00 REGION
LOCATION DETAILED: LEFT RIB CAGE
LOCATION DETAILED: RIGHT INFERIOR LATERAL MIDBACK
LOCATION DETAILED: RIGHT INFERIOR MEDIAL MIDBACK
LOCATION DETAILED: RIGHT INFERIOR LATERAL UPPER BACK
LOCATION DETAILED: RIGHT SUPERIOR MEDIAL MIDBACK

## 2025-03-24 ASSESSMENT — LOCATION SIMPLE DESCRIPTION DERM
LOCATION SIMPLE: RIGHT LOWER BACK
LOCATION SIMPLE: RIGHT UPPER BACK
LOCATION SIMPLE: ABDOMEN
LOCATION SIMPLE: RIGHT BREAST
LOCATION SIMPLE: LOWER BACK
LOCATION SIMPLE: LEFT UPPER BACK
LOCATION SIMPLE: LEFT LOWER BACK
LOCATION SIMPLE: LEFT BREAST

## 2025-03-24 ASSESSMENT — LOCATION ZONE DERM: LOCATION ZONE: TRUNK

## 2025-03-24 ASSESSMENT — PAIN INTENSITY VAS: HOW INTENSE IS YOUR PAIN 0 BEING NO PAIN, 10 BEING THE MOST SEVERE PAIN POSSIBLE?: NO PAIN

## 2025-03-24 NOTE — PROCEDURE: LIQUID NITROGEN
Consent: The patient's consent was obtained including but not limited to risks of crusting, scabbing, blistering, scarring, darker or lighter pigmentary change, recurrence, incomplete removal and infection.
Add 52 Modifier (Optional): no
Detail Level: Detailed
Medical Necessity Information: It is in your best interest to select a reason for this procedure from the list below. All of these items fulfill various CMS LCD requirements except the new and changing color options.
Show Spray Paint Technique Variable?: Yes
Post-Care Instructions: I reviewed with the patient in detail post-care instructions. Patient is to wear sunprotection, and avoid picking at any of the treated lesions. Pt may apply Vaseline to crusted or scabbing areas.
Spray Paint Text: The liquid nitrogen was applied to the skin utilizing a spray paint frosting technique.
Medical Necessity Clause: This procedure was medically necessary because the lesions that were treated were:
Duration Of Freeze Thaw-Cycle (Seconds): 0